# Patient Record
Sex: MALE | Race: WHITE | NOT HISPANIC OR LATINO | Employment: OTHER | URBAN - METROPOLITAN AREA
[De-identification: names, ages, dates, MRNs, and addresses within clinical notes are randomized per-mention and may not be internally consistent; named-entity substitution may affect disease eponyms.]

---

## 2017-10-16 ENCOUNTER — GENERIC CONVERSION - ENCOUNTER (OUTPATIENT)
Dept: OTHER | Facility: OTHER | Age: 82
End: 2017-10-16

## 2022-05-03 ENCOUNTER — HOSPITAL ENCOUNTER (EMERGENCY)
Facility: HOSPITAL | Age: 87
Discharge: HOME | End: 2022-05-03
Attending: EMERGENCY MEDICINE
Payer: MEDICARE

## 2022-05-03 VITALS
DIASTOLIC BLOOD PRESSURE: 69 MMHG | TEMPERATURE: 97.5 F | RESPIRATION RATE: 18 BRPM | HEART RATE: 75 BPM | OXYGEN SATURATION: 95 % | SYSTOLIC BLOOD PRESSURE: 115 MMHG

## 2022-05-03 DIAGNOSIS — U07.1 COVID-19: Primary | ICD-10-CM

## 2022-05-03 PROCEDURE — 99283 EMERGENCY DEPT VISIT LOW MDM: CPT

## 2022-05-03 PROCEDURE — 99284 EMERGENCY DEPT VISIT MOD MDM: CPT | Performed by: EMERGENCY MEDICINE

## 2022-05-03 RX ORDER — ONDANSETRON 4 MG/1
4 TABLET, FILM COATED ORAL EVERY 6 HOURS PRN
Qty: 20 TABLET | Refills: 0 | Status: SHIPPED | OUTPATIENT
Start: 2022-05-03

## 2022-05-03 RX ORDER — TRAZODONE HYDROCHLORIDE 50 MG/1
50 TABLET ORAL
COMMUNITY

## 2022-05-03 RX ORDER — SIMVASTATIN 10 MG
10 TABLET ORAL DAILY
COMMUNITY

## 2022-05-03 RX ORDER — TRAMADOL HYDROCHLORIDE 50 MG/1
50 TABLET ORAL EVERY 8 HOURS PRN
COMMUNITY
Start: 2022-03-04 | End: 2023-03-04

## 2022-05-03 RX ORDER — ATORVASTATIN CALCIUM 10 MG/1
10 TABLET, FILM COATED ORAL
COMMUNITY
Start: 2022-03-25

## 2022-05-03 RX ORDER — BUSPIRONE HYDROCHLORIDE 5 MG/1
5 TABLET ORAL 2 TIMES DAILY
COMMUNITY
Start: 2022-03-04 | End: 2023-03-04

## 2022-05-03 RX ORDER — PANTOPRAZOLE SODIUM 40 MG/1
40 TABLET, DELAYED RELEASE ORAL DAILY
COMMUNITY

## 2022-05-03 NOTE — ED PROVIDER NOTES
History  Chief Complaint   Patient presents with    Flu Symptoms     Pt reports testing positive for COVID today  C/o aches, sore throat, and HA   Sore Throat     HPI patient is a 82-year-old male  Patient reports he went on a senior trip last Wednesday and people were coughing  He reports Wednesday night and Thursday he developed cough and congestion  Patient reports symptoms over last 5 or 6 days  Patient reports he had a test today which showed that he had positive COVID  Patient reports no shortness of breath  He reports some nausea and decreased appetite  He reports feeling body aches and sore all over  Complains of some sore throat  He reports intermittent headache  Denies any fever  Patient reports currently primarily does not have nausea headache but reports some sore throat when he swallows  He does reports some diffuse body aches  Patient reports he has a history of some memory issues is not allowed to drive outside of the area from his doctor  Apparently his wife stays within frequently answer questions  I was able to bring the wife back into the room  She reports that he has been coughing and somewhat congested  Complaining of sore throat  They deny any acute shortness of breath  Patient denies any chest pain  Patient has planned spinal surgery in June, from the wife it appears to be steroid injections  Past medical history coronary disease, pacemaker, spinal surgery  Family history noncontributory  Nonsmoker no history of drug abuse, here with his wife, history of memory issues    Prior to Admission Medications   Prescriptions Last Dose Informant Patient Reported?  Taking?   atorvastatin (LIPITOR) 10 mg tablet   Yes Yes   Sig: Take 10 mg by mouth   busPIRone (BUSPAR) 5 mg tablet   Yes Yes   Sig: Take 5 mg by mouth 2 (two) times a day   carbidopa-levodopa (SINEMET)  mg per tablet   Yes Yes   Sig: Take 1 tablet by mouth daily   pantoprazole (PROTONIX) 40 mg tablet   Yes No Sig: Take 40 mg by mouth daily   simvastatin (ZOCOR) 10 mg tablet   Yes No   Sig: Take 10 mg by mouth daily   traMADol (ULTRAM) 50 mg tablet   Yes Yes   Sig: Take 50 mg by mouth every 8 (eight) hours as needed   traZODone (DESYREL) 50 mg tablet   Yes No   Sig: Take 50 mg by mouth      Facility-Administered Medications: None       History reviewed  No pertinent past medical history  History reviewed  No pertinent surgical history  History reviewed  No pertinent family history  I have reviewed and agree with the history as documented  E-Cigarette/Vaping     E-Cigarette/Vaping Substances     Social History     Tobacco Use    Smoking status: Never Smoker    Smokeless tobacco: Never Used   Substance Use Topics    Alcohol use: Never    Drug use: Never       Review of Systems   Constitutional: Negative for diaphoresis, fatigue and fever  HENT: Negative for congestion, ear pain, nosebleeds and sore throat  Eyes: Negative for photophobia, pain, discharge and visual disturbance  Respiratory: Positive for cough  Negative for choking, chest tightness, shortness of breath and wheezing  Cardiovascular: Negative for chest pain and palpitations  Gastrointestinal: Negative for abdominal distention, abdominal pain, diarrhea and vomiting  Genitourinary: Negative for dysuria, flank pain and frequency  Musculoskeletal: Negative for back pain, gait problem and joint swelling  Skin: Negative for color change and rash  Neurological: Negative for dizziness, syncope and headaches  Psychiatric/Behavioral: Negative for behavioral problems and confusion  The patient is not nervous/anxious  All other systems reviewed and are negative  Physical Exam  Physical Exam  Vitals and nursing note reviewed  Constitutional:       Appearance: He is well-developed  HENT:      Head: Normocephalic        Right Ear: External ear normal       Left Ear: External ear normal       Nose: Nose normal    Eyes: General: Lids are normal       Pupils: Pupils are equal, round, and reactive to light  Cardiovascular:      Rate and Rhythm: Normal rate and regular rhythm  Pulses: Normal pulses  Heart sounds: Normal heart sounds  Pulmonary:      Effort: Pulmonary effort is normal  No respiratory distress  Breath sounds: Normal breath sounds  Abdominal:      General: Abdomen is flat  Bowel sounds are normal       Tenderness: There is no abdominal tenderness  Musculoskeletal:         General: No deformity  Normal range of motion  Cervical back: Normal range of motion and neck supple  Skin:     General: Skin is warm and dry  Neurological:      Mental Status: He is alert and oriented to person, place, and time  Pulse oximetry 95% on room air adequate oxygenation , there is no hypoxia  Patient ambulates without discomfort  Vital Signs  ED Triage Vitals [05/03/22 1309]   Temperature Pulse Respirations Blood Pressure SpO2   97 5 °F (36 4 °C) 75 18 115/69 95 %      Temp Source Heart Rate Source Patient Position - Orthostatic VS BP Location FiO2 (%)   Temporal Monitor Sitting Left arm --      Pain Score       --           Vitals:    05/03/22 1309   BP: 115/69   Pulse: 75   Patient Position - Orthostatic VS: Sitting         Visual Acuity      ED Medications  Medications - No data to display    Diagnostic Studies  Results Reviewed     None                 No orders to display              Procedures  Procedures         ED Course                               SBIRT 22yo+      Most Recent Value   SBIRT (22 yo +)    In order to provide better care to our patients, we are screening all of our patients for alcohol and drug use  Would it be okay to ask you these screening questions? No Filed at: 05/03/2022 1329                    [facility] medical decision making 74-year-old male presents emergency department with some cough and congested tested positive for COVID today  Patient not hypoxic he is nontoxic  Discussed with patient he is out of the window for the oral COVID medications a greater than 5 days  Discussed with patient may qualify for monoclonal antibody but at this point I am not sure  Discussed I will submit his name for possible monoclonal antibody  This point since he is essentially tolerating his illness well no treatment is indicated, discussed indications to return such as increasing shortness of breath difficulty breathing shortness of breath with minimal exertion or any problems  Disposition  Final diagnoses:   COVID-19     Time reflects when diagnosis was documented in both MDM as applicable and the Disposition within this note     Time User Action Codes Description Comment    5/3/2022  1:30 PM Bruno Arzate Add [U07 1] COVID-19       ED Disposition     ED Disposition Condition Date/Time Comment    Discharge Stable Tue May 3, 2022  1:30 PM Agip U  89  discharge to home/self care              Follow-up Information     Follow up With Specialties Details Why Contact Info    Sravani Norris PA-C Physician Assistant   [address]  [phone]            Discharge Medication List as of 5/3/2022  1:35 PM      START taking these medications    Details   ondansetron (ZOFRAN) 4 mg tablet Take 1 tablet (4 mg total) by mouth every 6 (six) hours as needed for nausea or vomiting for up to 20 doses, Starting Tue 5/3/2022, Normal         CONTINUE these medications which have NOT CHANGED    Details   atorvastatin (LIPITOR) 10 mg tablet Take 10 mg by mouth, Starting Fri 3/25/2022, Historical Med      busPIRone (BUSPAR) 5 mg tablet Take 5 mg by mouth 2 (two) times a day, Starting Fri 3/4/2022, Until Sat 3/4/2023, Historical Med      carbidopa-levodopa (SINEMET)  mg per tablet Take 1 tablet by mouth daily, Starting Tue 11/9/2021, Until Wed 11/9/2022, Historical Med      traMADol (ULTRAM) 50 mg tablet Take 50 mg by mouth every 8 (eight) hours as needed, Starting Fri 3/4/2022, Until Sat 3/4/2023 at 2359, Historical Med      pantoprazole (PROTONIX) 40 mg tablet Take 40 mg by mouth daily, Historical Med      simvastatin (ZOCOR) 10 mg tablet Take 10 mg by mouth daily, Historical Med      traZODone (DESYREL) 50 mg tablet Take 50 mg by mouth, Historical Med             No discharge procedures on file      PDMP Review     None          ED Provider  Electronically Signed by           Rey Batres MD  05/03/22 [id number] [phone]

## 2022-05-03 NOTE — DISCHARGE INSTRUCTIONS
No sign of hypoxia or need for admission at this time  Zofran every 6-8 hours if needed for nausea, decreased appetite  Follow-up with your doctor and the [address] follow-up team a call you about monoclonal antibody    Returned increasing cough shortness of breath worsening symptoms or any problems

## 2022-05-04 ENCOUNTER — TELEPHONE (OUTPATIENT)
Dept: FAMILY MEDICINE CLINIC | Facility: CLINIC | Age: 87
End: 2022-05-04

## 2022-05-04 NOTE — TELEPHONE ENCOUNTER
Follow up call to patient  Spoke with spouse Ann Lizama)  No change to S/S from ER on 5/3/22  Patient continues with main complaint of sore throat  Spouse states follow up is made with PCP  They have my contact information if needed

## 2022-05-04 NOTE — TELEPHONE ENCOUNTER
----- Message from Aissatou Rodriguez MD sent at 5/3/2022  1:30 PM EDT -----  Regarding: Possible monoclonal antibody  82-year-old male hypoxic just tested recently for COVID, history of some dementia and coronary disease pacemaker, back trouble

## 2023-05-06 ENCOUNTER — OFFICE VISIT (OUTPATIENT)
Dept: URGENT CARE | Facility: CLINIC | Age: 88
End: 2023-05-06

## 2023-05-06 VITALS
RESPIRATION RATE: 16 BRPM | HEART RATE: 70 BPM | SYSTOLIC BLOOD PRESSURE: 154 MMHG | OXYGEN SATURATION: 96 % | DIASTOLIC BLOOD PRESSURE: 98 MMHG

## 2023-05-06 DIAGNOSIS — H11.32 CONJUNCTIVAL HEMORRHAGE OF LEFT EYE: Primary | ICD-10-CM

## 2023-05-06 RX ORDER — MEMANTINE HYDROCHLORIDE 10 MG/1
TABLET ORAL
COMMUNITY
Start: 2023-03-13

## 2023-05-06 RX ORDER — WARFARIN SODIUM 3 MG/1
TABLET ORAL
COMMUNITY
Start: 2023-03-13 | End: 2024-03-11

## 2023-05-06 NOTE — PROGRESS NOTES
[facility]        NAME: Chel Fuller is a 82 y o  male  : 1935    MRN: [id number]  DATE: May 6, 2023  TIME: 12:02 PM    Assessment and Orders   Conjunctival hemorrhage of left eye [H11 32]  1  Conjunctival hemorrhage of left eye              Plan and Discussion      Symptoms and exam consistent with comfortable hemorrhage  No treatment necessary  Encouraged daily Flonase for allergy relief  Reassured that patient is not contagious  Discussed ED precautions including (but not limited to)   Difficultly breathing or shortness of breath   Chest pain   Acutely worsening symptoms  Risks and benefits discussed  Patient understands and agrees with the plan  Follow up with PCP  Chief Complaint     Chief Complaint   Patient presents with    Conjunctivitis     STARTED YESTERDAY, BUT LOOKS WORSE TODAY AND PHARMACIST SAID TO GET IT CHECKED         History of Present Illness       Patient has been itching his eyes from allergies especially since working out in the yard  No discharge or fevers  Patient has an appointment with his eye doctor on Monday but wife was concerned that he was contagious and wanted to rule out bacterial conjunctivitis  Conjunctivitis   The current episode started 5 to 7 days ago  The onset was gradual  The problem has been gradually worsening  Associated symptoms include eye itching, rhinorrhea and eye redness  Pertinent negatives include no decreased vision, no double vision, no photophobia, no congestion, no eye discharge and no eye pain  Review of Systems   Review of Systems   HENT: Positive for rhinorrhea  Negative for congestion  Eyes: Positive for redness and itching  Negative for double vision, photophobia, pain and discharge           Current Medications       Current Outpatient Medications:     atorvastatin (LIPITOR) 10 mg tablet, Take 10 mg by mouth, Disp: , Rfl:     memantine (NAMENDA) 10 mg tablet, , Disp: , Rfl:     ondansetron (ZOFRAN) 4 mg tablet, Take 1 tablet (4 mg total) by mouth every 6 (six) hours as needed for nausea or vomiting for up to 20 doses, Disp: 20 tablet, Rfl: 0    warfarin (COUMADIN) 3 mg tablet, 4 5 mg (3 mg x 1 5) every Sun, Thu; 3 mg (3 mg x 1) all other days, Disp: , Rfl:     busPIRone (BUSPAR) 5 mg tablet, Take 5 mg by mouth 2 (two) times a day, Disp: , Rfl:     carbidopa-levodopa (SINEMET)  mg per tablet, Take 1 tablet by mouth daily, Disp: , Rfl:     simvastatin (ZOCOR) 10 mg tablet, Take 10 mg by mouth daily (Patient not taking: Reported on 5/6/2023), Disp: , Rfl:     traZODone (DESYREL) 50 mg tablet, Take 50 mg by mouth (Patient not taking: Reported on 5/6/2023), Disp: , Rfl:     Current Allergies     Allergies as of 05/06/2023    (No Known Allergies)            The following portions of the patient's history were reviewed and updated as appropriate: allergies, current medications, past family history, past medical history, past social history, past surgical history and problem list      No past medical history on file  No past surgical history on file  No family history on file  Medications have been verified  Objective   /98   Pulse 70   Resp 16   SpO2 96%   No LMP for male patient  Physical Exam     Physical Exam  HENT:      Head: Normocephalic and atraumatic  Eyes:     Pulmonary:      Effort: No respiratory distress  Neurological:      General: No focal deficit present  Mental Status: He is alert and oriented to person, place, and time     Psychiatric:         Mood and Affect: Mood normal          Behavior: Behavior normal                Carmelitaylle Abdiasing Klevero DO

## 2024-09-30 ENCOUNTER — EVALUATION (OUTPATIENT)
Dept: PHYSICAL THERAPY | Facility: CLINIC | Age: 89
End: 2024-09-30
Payer: MEDICARE

## 2024-09-30 DIAGNOSIS — R26.9 ABNORMAL GAIT: Primary | ICD-10-CM

## 2024-09-30 DIAGNOSIS — F03.918 DEMENTIA WITH OTHER BEHAVIORAL DISTURBANCE, UNSPECIFIED DEMENTIA SEVERITY, UNSPECIFIED DEMENTIA TYPE (HCC): ICD-10-CM

## 2024-09-30 DIAGNOSIS — I48.20 CHRONIC ATRIAL FIBRILLATION (HCC): ICD-10-CM

## 2024-09-30 PROCEDURE — 97535 SELF CARE MNGMENT TRAINING: CPT

## 2024-09-30 PROCEDURE — 97162 PT EVAL MOD COMPLEX 30 MIN: CPT

## 2024-09-30 NOTE — PROGRESS NOTES
PT Evaluation     Today's date: 2024  Patient name: Jesse Shore  : 1935  MRN: [id number]  Referring provider: Humberto Escalona PA-C  Dx:   Encounter Diagnosis     ICD-10-CM    1. Abnormal gait  R26.9       2. Dementia with other behavioral disturbance, unspecified dementia severity, unspecified dementia type (HCC)  F03.918       3. Chronic atrial fibrillation (HCC)  I48.20                      Assessment  Impairments: abnormal gait, activity intolerance, impaired balance, impaired physical strength, lacks appropriate home exercise program, pain with function, poor posture , activity limitations and endurance    Assessment details: Pt presents with decline in ambulation, balance, and strength/endurance.  Hx multiple falls with last fall 2 weeks ago.  Reports limited overall activity levels.  Increased left lower back pain from testing left kidney.  Pain increases in lower back with standing/walking.  Difficulty with all balance activity. Increased TUG test and 5 time sit to stand noted.  Reports symptoms of orthostatic hypotension as well with positional changes.  Will benefit from PT tx to decrease symptoms and improve functional level.       Prognosis: fair    Goals  ST.  Decrease pain 50% 6 wk.  2.  Increase BLE strength by 1/2 MMT grade 6 wk.  3.  Decrease TUG Test time by 2-3  sec 6 wk  4.  Decreased 5x sit to stand test time by 2-3 sec 6 wk  5.  Pt will report no falls with mobility 6 wk       LT.  Pt will report no pain 12 wk.   2.  Increase BLE strength by 1 MMT grade 12 wk   3.  Decrease TUG Test time by 4-5 sec 12 wk  4.  Decrease 5x sit to stand test time by 4-5 sec 12 wk  5.  Pt will report no limitations with ADL's 12 wk  6.  Pt will report no LOB's with ambulation/ADL's 12 wk    Plan  Patient would benefit from: skilled physical therapy and PT eval  Planned modality interventions: cryotherapy and thermotherapy: hydrocollator packs    Planned therapy interventions: abdominal trunk  stabilization, manual therapy, neuromuscular re-education, balance, self care, strengthening, stretching, therapeutic activities, therapeutic exercise, flexibility, functional ROM exercises, gait training and home exercise program    Frequency: 3x week  Duration in weeks: 12  Treatment plan discussed with: patient        Subjective Evaluation    History of Present Illness  Mechanism of injury: Pt presents with decline in ambulatory ability, decreased balance, and hx falls.  Last fall 2 weeks ago with abrasion to left elbow.  Reports left lower back pain due to biopsy/injections to left kidney.  No buckling of LE's noted.  Discomfort noted RLE at times.   Reports decline in functional level with injection and current CT scans to left kidney.    Patient Goals  Patient goal: I want to get better  Pain  Current pain ratin  At best pain ratin  At worst pain ratin  Location: left lower back  Quality: dull ache and discomfort  Relieving factors: change in position and heat  Aggravating factors: walking, standing and sitting          Objective     Postural Observations    Additional Postural Observation Details  Trunk minimally forward flexed  Forward head  Forward rounded shoulders     Palpation   Left   Muscle spasm in the erector spinae and lumbar paraspinals.   Tenderness of the erector spinae, lumbar paraspinals and quadratus lumborum.     Neurological Testing     Sensation     Lumbar   Left   Intact: light touch    Right   Intact: light touch    Strength/Myotome Testing     Left Hip   Planes of Motion   Flexion: 3  Extension: 3  Abduction: 3+  Adduction: 3+    Right Hip   Planes of Motion   Flexion: 3+  Extension: 3+  Abduction: 3+  Adduction: 3+    Left Knee   Flexion: 3  Extension: 3    Right Knee   Flexion: 3+  Extension: 3+    Left Ankle/Foot   Dorsiflexion: 3  Plantar flexion: 3    Right Ankle/Foot   Dorsiflexion: 3+  Plantar flexion: 3+    Ambulation     Ambulation: Stairs   Ascend stairs:  independent  Pattern: non-reciprocal  Railings: two rails  Descend stairs: independent  Pattern: reciprocal  Railings: two rails    Additional Stairs Ambulation Details  More difficulty ascending     Observational Gait   Gait: asymmetric   Decreased walking speed and stride length.     Additional Observational Gait Details  Has cane for gait  Walking short distances only due to fatigue and SOB  Difficulty walking up driveway due to incline   Reports left lower L-spine stiff and sore with ambulation in facility  Rigid trunk, decreased pace and step length  Trunk minimally forward flexed and head forward flexed    Functional Assessment        Comments  Tandem Stance:  Unable   Neuro Exam:     Functional outcomes   5x sit to stand: 43.75 (seconds)  TU.20 (no AD) (seconds)      Balance assessments   Single leg stance   Left eyes open firm surface: 0  Right eyes open firm surface: 1-2         Precautions:  10# lifting restriction, PACEMAKER (no e-stim/ultrasound)       Manuals 24                                       Neuro Re-Ed         Side stepping at rail        U stance        Tandem stance        Stepping on/off foam                                Ther Ex        Nustep for LE strength, endurance        TR/HR        LAQ        T-band HS curl                                HEP Instructed and issued HEP  ([id number])       Ther Activity        Mini squat        Sit to stand        Step up                Gait Training                        Modalities

## 2024-09-30 NOTE — LETTER
2024    Humberto Escalona PA-C  [address] [place] [address]  [address]  [facility] [place] [address]    Patient: Jesse Shore   YOB: 1935   Date of Visit: 2024     Encounter Diagnosis     ICD-10-CM    1. Abnormal gait  R26.9       2. Dementia with other behavioral disturbance, unspecified dementia severity, unspecified dementia type (HCC)  F03.918       3. Chronic atrial fibrillation (HCC)  I48.20           Dear Dr. Escalona:    Thank you for your recent referral of Jesse Shore. Please review the attached evaluation summary from Jesse's recent visit.     Please verify that you agree with the plan of care by signing the attached order.     If you have any questions or concerns, please do not hesitate to call.     I sincerely appreciate the opportunity to share in the care of one of your patients and hope to have another opportunity to work with you in the near future.       Sincerely,    Salbador Altman, PT      Referring Provider:      I certify that I have read the below Plan of Care and certify the need for these services furnished under this plan of treatment while under my care.                    Humberto Escalona PA-C  [address] [place] [address]  [address]  [facility] [place] [address]  Via Fax: [phone]          PT Evaluation     Today's date: 2024  Patient name: Jesse Shore  : 1935  MRN: [id number]  Referring provider: Humberto Escalona PA-C  Dx:   Encounter Diagnosis     ICD-10-CM    1. Abnormal gait  R26.9       2. Dementia with other behavioral disturbance, unspecified dementia severity, unspecified dementia type (HCC)  F03.918       3. Chronic atrial fibrillation (HCC)  I48.20                      Assessment  Impairments: abnormal gait, activity intolerance, impaired balance, impaired physical strength, lacks appropriate home exercise program, pain with function, poor posture , activity limitations and endurance    Assessment details: Pt presents with decline in ambulation, balance, and  strength/endurance.  Hx multiple falls with last fall 2 weeks ago.  Reports limited overall activity levels.  Increased left lower back pain from testing left kidney.  Pain increases in lower back with standing/walking.  Difficulty with all balance activity. Increased TUG test and 5 time sit to stand noted.  Reports symptoms of orthostatic hypotension as well with positional changes.  Will benefit from PT tx to decrease symptoms and improve functional level.       Prognosis: fair    Goals  ST.  Decrease pain 50% 6 wk.  2.  Increase BLE strength by 1/2 MMT grade 6 wk.  3.  Decrease TUG Test time by 2-3  sec 6 wk  4.  Decreased 5x sit to stand test time by 2-3 sec 6 wk  5.  Pt will report no falls with mobility 6 wk       LT.  Pt will report no pain 12 wk.   2.  Increase BLE strength by 1 MMT grade 12 wk   3.  Decrease TUG Test time by 4-5 sec 12 wk  4.  Decrease 5x sit to stand test time by 4-5 sec 12 wk  5.  Pt will report no limitations with ADL's 12 wk  6.  Pt will report no LOB's with ambulation/ADL's 12 wk    Plan  Patient would benefit from: skilled physical therapy and PT eval  Planned modality interventions: cryotherapy and thermotherapy: hydrocollator packs    Planned therapy interventions: abdominal trunk stabilization, manual therapy, neuromuscular re-education, balance, self care, strengthening, stretching, therapeutic activities, therapeutic exercise, flexibility, functional ROM exercises, gait training and home exercise program    Frequency: 3x week  Duration in weeks: 12  Treatment plan discussed with: patient        Subjective Evaluation    History of Present Illness  Mechanism of injury: Pt presents with decline in ambulatory ability, decreased balance, and hx falls.  Last fall 2 weeks ago with abrasion to left elbow.  Reports left lower back pain due to biopsy/injections to left kidney.  No buckling of LE's noted.  Discomfort noted RLE at times.   Reports decline in functional level with  injection and current CT scans to left kidney.    Patient Goals  Patient goal: I want to get better  Pain  Current pain ratin  At best pain ratin  At worst pain ratin  Location: left lower back  Quality: dull ache and discomfort  Relieving factors: change in position and heat  Aggravating factors: walking, standing and sitting          Objective     Postural Observations    Additional Postural Observation Details  Trunk minimally forward flexed  Forward head  Forward rounded shoulders     Palpation   Left   Muscle spasm in the erector spinae and lumbar paraspinals.   Tenderness of the erector spinae, lumbar paraspinals and quadratus lumborum.     Neurological Testing     Sensation     Lumbar   Left   Intact: light touch    Right   Intact: light touch    Strength/Myotome Testing     Left Hip   Planes of Motion   Flexion: 3  Extension: 3  Abduction: 3+  Adduction: 3+    Right Hip   Planes of Motion   Flexion: 3+  Extension: 3+  Abduction: 3+  Adduction: 3+    Left Knee   Flexion: 3  Extension: 3    Right Knee   Flexion: 3+  Extension: 3+    Left Ankle/Foot   Dorsiflexion: 3  Plantar flexion: 3    Right Ankle/Foot   Dorsiflexion: 3+  Plantar flexion: 3+    Ambulation     Ambulation: Stairs   Ascend stairs: independent  Pattern: non-reciprocal  Railings: two rails  Descend stairs: independent  Pattern: reciprocal  Railings: two rails    Additional Stairs Ambulation Details  More difficulty ascending     Observational Gait   Gait: asymmetric   Decreased walking speed and stride length.     Additional Observational Gait Details  Has cane for gait  Walking short distances only due to fatigue and SOB  Difficulty walking up driveway due to incline   Reports left lower L-spine stiff and sore with ambulation in facility  Rigid trunk, decreased pace and step length  Trunk minimally forward flexed and head forward flexed    Functional Assessment        Comments  Tandem Stance:  Unable   Neuro Exam:     Functional  outcomes   5x sit to stand: 43.75 (seconds)  TU.20 (no AD) (seconds)      Balance assessments   Single leg stance   Left eyes open firm surface: 0  Right eyes open firm surface: 1-2         Precautions:  10# lifting restriction, PACEMAKER (no e-stim/ultrasound)       Manuals -24                                       Neuro Re-Ed         Side stepping at rail        U stance        Tandem stance        Stepping on/off foam                                Ther Ex        Nustep for LE strength, endurance        TR/HR        LAQ        T-band HS curl                                HEP Instructed and issued HEP  ([id number])       Ther Activity        Mini squat        Sit to stand        Step up                Gait Training                        Modalities

## 2024-10-03 ENCOUNTER — OFFICE VISIT (OUTPATIENT)
Dept: PHYSICAL THERAPY | Facility: CLINIC | Age: 89
End: 2024-10-03
Payer: MEDICARE

## 2024-10-03 DIAGNOSIS — R26.9 ABNORMAL GAIT: Primary | ICD-10-CM

## 2024-10-03 DIAGNOSIS — F03.918 DEMENTIA WITH OTHER BEHAVIORAL DISTURBANCE, UNSPECIFIED DEMENTIA SEVERITY, UNSPECIFIED DEMENTIA TYPE (HCC): ICD-10-CM

## 2024-10-03 DIAGNOSIS — I48.20 CHRONIC ATRIAL FIBRILLATION (HCC): ICD-10-CM

## 2024-10-03 PROCEDURE — 97112 NEUROMUSCULAR REEDUCATION: CPT

## 2024-10-03 PROCEDURE — 97110 THERAPEUTIC EXERCISES: CPT

## 2024-10-03 NOTE — PROGRESS NOTES
"Daily Note     Today's date: 10/3/2024  Patient name: Jesse Shore  : 1935  MRN: [id number]  Referring provider: Humberto Escalona PA-C  Dx:   Encounter Diagnosis     ICD-10-CM    1. Abnormal gait  R26.9       2. Dementia with other behavioral disturbance, unspecified dementia severity, unspecified dementia type (HCC)  F03.918       3. Chronic atrial fibrillation (HCC)  I48.20                      Subjective:   I had an anxiety attack this morning so I'm still feeling not right from that      Objective: See treatment diary below      Assessment: Tolerated treatment fair.  Limited tolerance to TE.  Frequent rest periods required.  Reports anxiety medication makes him tired.  Reports still feeling some anxiety from earlier in the day.  Patient would benefit from continued PT      Plan: Continue per plan of care.       Precautions:  10# lifting restriction, PACEMAKER (no e-stim/ultrasound)       Manuals 9-30-24 10-3-24                                      Neuro Re-Ed         Side stepping at rail        U stance        Tandem stance  On foam 2x ea LE Fwd on foam to tolerance      Stepping on/off foam  5x R/L at rail on/off foam                              Ther Ex        Nustep for LE strength, endurance  L1  5'      TR/HR  10x ea      LAQ  1#  10x mychal      T-band HS curl  Green 10x mychal      Seated t-band abd  Green 10x 3\"      Add sq  10x  5\"                                      HEP Instructed and issued HEP  ([id number])       Ther Activity        Mini squat  9x      Sit to stand        Step up                Gait Training                        Modalities                            "

## 2024-10-07 ENCOUNTER — OFFICE VISIT (OUTPATIENT)
Dept: PHYSICAL THERAPY | Facility: CLINIC | Age: 89
End: 2024-10-07
Payer: MEDICARE

## 2024-10-07 DIAGNOSIS — I48.20 CHRONIC ATRIAL FIBRILLATION (HCC): ICD-10-CM

## 2024-10-07 DIAGNOSIS — F03.918 DEMENTIA WITH OTHER BEHAVIORAL DISTURBANCE, UNSPECIFIED DEMENTIA SEVERITY, UNSPECIFIED DEMENTIA TYPE (HCC): ICD-10-CM

## 2024-10-07 DIAGNOSIS — R26.9 ABNORMAL GAIT: Primary | ICD-10-CM

## 2024-10-07 PROCEDURE — 97112 NEUROMUSCULAR REEDUCATION: CPT

## 2024-10-07 PROCEDURE — 97110 THERAPEUTIC EXERCISES: CPT

## 2024-10-07 PROCEDURE — 97530 THERAPEUTIC ACTIVITIES: CPT

## 2024-10-07 NOTE — PROGRESS NOTES
"Daily Note     Today's date: 10/7/2024  Patient name: Jesse Shore  : 1935  MRN: [id number]  Referring provider: Humberto Escalona PA-C  Dx:   Encounter Diagnosis     ICD-10-CM    1. Abnormal gait  R26.9       2. Dementia with other behavioral disturbance, unspecified dementia severity, unspecified dementia type (HCC)  F03.918       3. Chronic atrial fibrillation (HCC)  I48.20                      Subjective:   I'm ok      Objective: See treatment diary below      Assessment: Tolerated treatment fair.   Fatigues easily with TE.  Demonstrates weakness with seated LE TE.  Reports nustep felt good.  Patient would benefit from continued PT      Plan: Continue per plan of care.       Precautions:  10# lifting restriction, PACEMAKER (no e-stim/ultrasound)       Manuals 9-30-24 10-3-24 10-7-24                                     Neuro Re-Ed         Side stepping at rail        U stance        Tandem stance  On foam 2x ea LE Fwd on foam to tolerance On foam 2x ea LE Fwd on foam to tolerance       Stepping on/off foam  5x R/L at rail on/off foam 8x  R/L at rail on/off foam                             Ther Ex        Nustep for LE strength, endurance  L1  5' L2  10'     TR/HR  10x ea 10x     LAQ  1#  10x mychal 1#  8x  mychal      T-band HS curl  Green 10x mychal      Seated t-band abd  Green 10x 3\" Green 10x  3\"     Add sq  10x  5\" 7x  5\"                                     HEP Instructed and issued HEP  ([id number])       Ther Activity        Mini squat  9x 2x 5 reps     Sit to stand        Step up                Gait Training                        Modalities                            "

## 2024-10-09 ENCOUNTER — OFFICE VISIT (OUTPATIENT)
Dept: PHYSICAL THERAPY | Facility: CLINIC | Age: 89
End: 2024-10-09
Payer: MEDICARE

## 2024-10-09 DIAGNOSIS — F03.918 DEMENTIA WITH OTHER BEHAVIORAL DISTURBANCE, UNSPECIFIED DEMENTIA SEVERITY, UNSPECIFIED DEMENTIA TYPE (HCC): ICD-10-CM

## 2024-10-09 DIAGNOSIS — I48.20 CHRONIC ATRIAL FIBRILLATION (HCC): ICD-10-CM

## 2024-10-09 DIAGNOSIS — R26.9 ABNORMAL GAIT: Primary | ICD-10-CM

## 2024-10-09 PROCEDURE — 97112 NEUROMUSCULAR REEDUCATION: CPT

## 2024-10-09 PROCEDURE — 97110 THERAPEUTIC EXERCISES: CPT

## 2024-10-09 NOTE — PROGRESS NOTES
"Daily Note     Today's date: 10/9/2024  Patient name: Jesse Shore  : 1935  MRN: [id number]  Referring provider: Humberto Escalona PA-C  Dx:   Encounter Diagnosis     ICD-10-CM    1. Abnormal gait  R26.9       2. Dementia with other behavioral disturbance, unspecified dementia severity, unspecified dementia type (HCC)  F03.918       3. Chronic atrial fibrillation (HCC)  I48.20                      Subjective:   I'm ok.  I can get up the steps better      Objective: See treatment diary below      Assessment: Tolerated treatment fair.  Continues to fatigue easily with activity.  Reports continued tightness left lower lumbar region.  To have CT scan next week of kidney.  Patient would benefit from continued PT      Plan: Continue per plan of care.       Precautions:  10# lifting restriction, PACEMAKER (no e-stim/ultrasound)       Manuals 9-30-24 10-3-24 10-7-24 10-9-24                                    Neuro Re-Ed         Side stepping at rail        U stance        Tandem stance  On foam 2x ea LE Fwd on foam to tolerance On foam 2x ea LE Fwd on foam to tolerance   On foam 2x ea LE Fwd on foam to tolerance    Stepping on/off foam  5x R/L at rail on/off foam 8x  R/L at rail on/off foam 8x R/L at rail on/off foam    LTP/MTP    Red LTP 10x  Red MTP 7x                     Ther Ex        Nustep for LE strength, endurance  L1  5' L2  10' L2  12'    TR/HR  10x ea 10x 10x      LAQ  1#  10x mychal 1#  8x  mychal  2x 5 reps mychal    T-band HS curl  Green 10x mychal      Seated t-band abd  Green 10x 3\" Green 10x  3\"     Add sq  10x  5\" 7x  5\"     Seated march    10x  mychal                            HEP Instructed and issued HEP  ([id number])       Ther Activity        Mini squat  9x 2x 5 reps 2x5 reps    Sit to stand        Step up                Gait Training                        Modalities                            "

## 2024-10-15 ENCOUNTER — OFFICE VISIT (OUTPATIENT)
Dept: PHYSICAL THERAPY | Facility: CLINIC | Age: 89
End: 2024-10-15
Payer: MEDICARE

## 2024-10-15 DIAGNOSIS — F03.918 DEMENTIA WITH OTHER BEHAVIORAL DISTURBANCE, UNSPECIFIED DEMENTIA SEVERITY, UNSPECIFIED DEMENTIA TYPE (HCC): ICD-10-CM

## 2024-10-15 DIAGNOSIS — R26.9 ABNORMAL GAIT: Primary | ICD-10-CM

## 2024-10-15 DIAGNOSIS — I48.20 CHRONIC ATRIAL FIBRILLATION (HCC): ICD-10-CM

## 2024-10-15 PROCEDURE — 97110 THERAPEUTIC EXERCISES: CPT

## 2024-10-15 NOTE — PROGRESS NOTES
"Daily Note     Today's date: 10/15/2024  Patient name: Jesse Shore  : 1935  MRN: [id number]  Referring provider: Humberto Escalona PA-C  Dx:   Encounter Diagnosis     ICD-10-CM    1. Abnormal gait  R26.9       2. Dementia with other behavioral disturbance, unspecified dementia severity, unspecified dementia type (HCC)  F03.918       3. Chronic atrial fibrillation (HCC)  I48.20                      Subjective:   I'm not good today.  I had a CT scan yesterday and I didn't sleep well last night      Objective: See treatment diary below      Assessment: Tolerated treatment fair.   Limited TE due to reports of fatigue and not feeling well following CT scan yesterday.  Results of scan unknown presently.  Patient would benefit from continued PT      Plan: Continue per plan of care.       Precautions:  10# lifting restriction, PACEMAKER (no e-stim/ultrasound)       Manuals -24 10-3-24 10-7-24 10-9-24 10-15-24                                   Neuro Re-Ed         Side stepping at rail        U stance        Tandem stance  On foam 2x ea LE Fwd on foam to tolerance On foam 2x ea LE Fwd on foam to tolerance   On foam 2x ea LE Fwd on foam to tolerance    Stepping on/off foam  5x R/L at rail on/off foam 8x  R/L at rail on/off foam 8x R/L at rail on/off foam 8x R/L at rail on/off foam (lateral) no hand hold 3x's   LTP/MTP    Red LTP 10x  Red MTP 7x                     Ther Ex        Nustep for LE strength, endurance  L1  5' L2  10' L2  12' L2  15'   TR/HR  10x ea 10x 10x   10z   LAQ  1#  10x mychal 1#  8x  mychal  2x 5 reps mychal    T-band HS curl  Green 10x mychal      Seated t-band abd  Green 10x 3\" Green 10x  3\"  Green 15x   Add sq  10x  5\" 7x  5\"     Seated march    10x  mychal 12x mychal                           HEP Instructed and issued HEP  ([id number])       Ther Activity        Mini squat  9x 2x 5 reps 2x5 reps    Sit to stand        Step up                Gait Training                        Modalities                            "

## 2024-10-17 ENCOUNTER — OFFICE VISIT (OUTPATIENT)
Dept: PHYSICAL THERAPY | Facility: CLINIC | Age: 89
End: 2024-10-17
Payer: MEDICARE

## 2024-10-17 DIAGNOSIS — R26.9 ABNORMAL GAIT: Primary | ICD-10-CM

## 2024-10-17 DIAGNOSIS — F03.918 DEMENTIA WITH OTHER BEHAVIORAL DISTURBANCE, UNSPECIFIED DEMENTIA SEVERITY, UNSPECIFIED DEMENTIA TYPE (HCC): ICD-10-CM

## 2024-10-17 DIAGNOSIS — I48.20 CHRONIC ATRIAL FIBRILLATION (HCC): ICD-10-CM

## 2024-10-17 PROCEDURE — 97110 THERAPEUTIC EXERCISES: CPT

## 2024-10-17 PROCEDURE — 97112 NEUROMUSCULAR REEDUCATION: CPT

## 2024-10-17 NOTE — PROGRESS NOTES
Daily Note     Today's date: 10/17/2024  Patient name: Jesse Shore  : 1935  MRN: [id number]  Referring provider: Humberto Escalona PA-C  Dx:   Encounter Diagnosis     ICD-10-CM    1. Abnormal gait  R26.9       2. Dementia with other behavioral disturbance, unspecified dementia severity, unspecified dementia type (HCC)  F03.918       3. Chronic atrial fibrillation (HCC)  I48.20                      Subjective:   I feel better than I did the other day I was here.      Objective: See treatment diary below      Assessment: Tolerated treatment fair.   Reports feeling better than he did at prior session on 10-15-24.  PT noted pt left eye appeared to have had a vessel rupture as eye blood red.  Pt was unaware of eye.  He reported no pain or vision changes.  Reports this has happened before and had MD follow up in the past for same symptoms.  Improved activity tolerance noted during session compared to last session.  Difficulty with balance activity.  Patient would benefit from continued PT      Plan: Continue per plan of care.       Precautions:  10# lifting restriction, PACEMAKER (no e-stim/ultrasound)       Manuals 10-17-24 10-3-24 10-7-24 10-9-24 10-15-24                                   Neuro Re-Ed         Side stepping at rail        U stance        Stand static on foam 1x to tolerance       Tandem stance 3x to celena ea LE Fwd on foam On foam 2x ea LE Fwd on foam to tolerance On foam 2x ea LE Fwd on foam to tolerance   On foam 2x ea LE Fwd on foam to tolerance    Stepping on/off foam 10x R/L at rail on/off foam (lateral)  hand hold and no hand hold  5x R/L at rail on/off foam 8x  R/L at rail on/off foam 8x R/L at rail on/off foam 8x R/L at rail on/off foam (lateral) no hand hold 3x's   LTP/MTP    Red LTP 10x  Red MTP 7x                     Ther Ex        Nustep for LE strength, endurance L2  20' L1  5' L2  10' L2  12' L2  15'   TR/HR 10x 10x ea 10x 10x   10z   LAQ  1#  10x mychal 1#  8x  mychal  2x 5 reps mychal    T-band  "HS curl  Green 10x mychal      Seated t-band abd Green 10x Green 10x 3\" Green 10x  3\"  Green 15x   Add sq 10x  5\" 10x  5\" 7x  5\"     Seated march    10x  mychal 12x mychal                           HEP Instructed and issued HEP  ([id number])       Ther Activity        Mini squat 10x 9x 2x 5 reps 2x5 reps    Sit to stand        Step up                Gait Training                        Modalities                            "

## 2024-10-21 ENCOUNTER — APPOINTMENT (OUTPATIENT)
Dept: PHYSICAL THERAPY | Facility: CLINIC | Age: 89
End: 2024-10-21
Payer: MEDICARE

## 2024-10-23 ENCOUNTER — OFFICE VISIT (OUTPATIENT)
Dept: PHYSICAL THERAPY | Facility: CLINIC | Age: 89
End: 2024-10-23
Payer: MEDICARE

## 2024-10-23 DIAGNOSIS — F03.918 DEMENTIA WITH OTHER BEHAVIORAL DISTURBANCE, UNSPECIFIED DEMENTIA SEVERITY, UNSPECIFIED DEMENTIA TYPE (HCC): ICD-10-CM

## 2024-10-23 DIAGNOSIS — I48.20 CHRONIC ATRIAL FIBRILLATION (HCC): ICD-10-CM

## 2024-10-23 DIAGNOSIS — R26.9 ABNORMAL GAIT: Primary | ICD-10-CM

## 2024-10-23 PROCEDURE — 97112 NEUROMUSCULAR REEDUCATION: CPT

## 2024-10-23 PROCEDURE — 97110 THERAPEUTIC EXERCISES: CPT

## 2024-10-23 NOTE — PROGRESS NOTES
"Daily Note     Today's date: 10/23/2024  Patient name: Jesse Shore  : 1935  MRN: [id number]  Referring provider: Humberto Escalona PA-C  Dx:   Encounter Diagnosis     ICD-10-CM    1. Abnormal gait  R26.9       2. Dementia with other behavioral disturbance, unspecified dementia severity, unspecified dementia type (HCC)  F03.918       3. Chronic atrial fibrillation (HCC)  I48.20                      Subjective:   I feel better than I did the other day I was here.      Objective: See treatment diary below      Assessment: Tolerated treatment fair. Pt continues to demonstrate good effort throughout session. He began on the nu step as an active warmup. Frequent rest breaks were needed throughout session due to fatigue. He did do well with side steps over the foam needing CGAx1 and at times min Ax1. Patient would benefit from continued PT      Plan: Continue per plan of care.       Precautions:  10# lifting restriction, PACEMAKER (no e-stim/ultrasound)       Manuals 10-17-24 10-23-24  10-9-24 10-15-24                                   Neuro Re-Ed         Side stepping at rail        U stance        Stand static on foam 1x to tolerance 1x to tolerance      Tandem stance 3x to celena ea LE Fwd on foam 3x to celena ea LE Fwd on foam  On foam 2x ea LE Fwd on foam to tolerance    Stepping on/off foam 10x R/L at rail on/off foam (lateral)  hand hold and no hand hold  10x R/L at rail on/off foam (lateral)  hand hold and no hand hold   8x R/L at rail on/off foam 8x R/L at rail on/off foam (lateral) no hand hold 3x's   LTP/MTP    Red LTP 10x  Red MTP 7x                     Ther Ex        Nustep for LE strength, endurance L2  20' L2 20'  L2  12' L2  15'   TR/HR 10x 10x  10x   10z   LAQ    2x 5 reps mychal    T-band HS curl        Seated t-band abd Green 10x Green 15x   Green 15x   Add sq 10x  5\" 15x5\"      Seated march    10x  mychal 12x mychal                           HEP Instructed and issued HEP  ([id number])       Ther Activity      "   Mini squat 10x 10x  2x5 reps    Sit to stand        Step up                Gait Training                        Modalities

## 2024-10-30 ENCOUNTER — OFFICE VISIT (OUTPATIENT)
Dept: PHYSICAL THERAPY | Facility: CLINIC | Age: 89
End: 2024-10-30
Payer: MEDICARE

## 2024-10-30 DIAGNOSIS — I48.20 CHRONIC ATRIAL FIBRILLATION (HCC): ICD-10-CM

## 2024-10-30 DIAGNOSIS — F03.918 DEMENTIA WITH OTHER BEHAVIORAL DISTURBANCE, UNSPECIFIED DEMENTIA SEVERITY, UNSPECIFIED DEMENTIA TYPE (HCC): ICD-10-CM

## 2024-10-30 DIAGNOSIS — R26.9 ABNORMAL GAIT: Primary | ICD-10-CM

## 2024-10-30 PROCEDURE — 97112 NEUROMUSCULAR REEDUCATION: CPT

## 2024-10-30 PROCEDURE — 97110 THERAPEUTIC EXERCISES: CPT

## 2024-10-30 NOTE — PROGRESS NOTES
PT RE-EVALUATION     Today's date: 10/30/2024  Patient name: Jesse Shore  : 1935  MRN: [id number]  Referring provider: Humberto Escalona PA-C  Dx:   Encounter Diagnosis     ICD-10-CM    1. Abnormal gait  R26.9       2. Dementia with other behavioral disturbance, unspecified dementia severity, unspecified dementia type (HCC)  F03.918       3. Chronic atrial fibrillation (HCC)  I48.20                      Assessment  Impairments: abnormal gait, activity intolerance, impaired balance, impaired physical strength, lacks appropriate home exercise program, pain with function, poor posture , activity limitations and endurance    Assessment details: Pt presents with decline in ambulation, balance, and strength/endurance.  Hx multiple falls with last fall 2 weeks ago.  Reports limited overall activity levels.  Increased left lower back pain from testing left kidney.  Pain increases in lower back with standing/walking.  Difficulty with all balance activity. Increased TUG test and 5 time sit to stand noted.  Reports symptoms of orthostatic hypotension as well with positional changes.  Will benefit from PT tx to decrease symptoms and improve functional level.      10-30-24:  Pt reports continued symptoms left lower back region.  Reports he will be seeing pain management in approx 2 weeks.  Continues to fatigue easily with activity.  Gait asymmetric and slowed.  Intermittent use of SPC noted.  Difficulty reaching down and pushing back up from low seated position noted.  Will benefit from continued PT tx to decrease symptoms and improve functional level.       Prognosis: fair    Goals  ST.  Decrease pain 50% 6 wk.  2.  Increase BLE strength by 1/2 MMT grade 6 wk.  3.  Decrease TUG Test time by 2-3  sec 6 wk  4.  Decreased 5x sit to stand test time by 2-3 sec 6 wk  5.  Pt will report no falls with mobility 6 wk   (met/partially met)    LT.  Pt will report no pain 12 wk.   2.  Increase BLE strength by 1 MMT grade 12  wk   3.  Decrease TUG Test time by 4-5 sec 12 wk  4.  Decrease 5x sit to stand test time by 4-5 sec 12 wk  5.  Pt will report no limitations with ADL's 12 wk  6.  Pt will report no LOB's with ambulation/ADL's 12 wk  (partially met/not met)    Plan  Patient would benefit from: skilled physical therapy and PT eval  Planned modality interventions: cryotherapy and thermotherapy: hydrocollator packs    Planned therapy interventions: abdominal trunk stabilization, manual therapy, neuromuscular re-education, balance, self care, strengthening, stretching, therapeutic activities, therapeutic exercise, flexibility, functional ROM exercises, gait training and home exercise program    Frequency: 3x week  Duration in weeks: 12  Treatment plan discussed with: patient        Subjective Evaluation    History of Present Illness  Mechanism of injury: Pt presents with decline in ambulatory ability, decreased balance, and hx falls.  Last fall 2 weeks ago with abrasion to left elbow.  Reports left lower back pain due to biopsy/injections to left kidney.  No buckling of LE's noted.  Discomfort noted RLE at times.   Reports decline in functional level with injection and current CT scans to left kidney.    Patient Goals  Patient goals for therapy: decreased pain  Patient goal: I want to get better  Pain  Current pain ratin  At best pain ratin  At worst pain ratin  Location: left lower back  Quality: dull ache, discomfort and sharp  Relieving factors: change in position and heat  Aggravating factors: walking, standing, sitting and lifting    Treatments  Current treatment: physical therapy      Objective     Postural Observations    Additional Postural Observation Details  Trunk minimally forward flexed  Forward head  Forward rounded shoulders     Palpation   Left   Muscle spasm in the erector spinae and lumbar paraspinals.   Tenderness of the erector spinae, lumbar paraspinals and quadratus lumborum.     Neurological Testing      Sensation     Lumbar   Left   Intact: light touch    Right   Intact: light touch    Strength/Myotome Testing     Left Hip   Planes of Motion   Flexion: 3+  Extension: 3+  Abduction: 4-  Adduction: 4-    Right Hip   Planes of Motion   Flexion: 4-  Extension: 4-  Abduction: 4-  Adduction: 4-    Left Knee   Flexion: 3+  Extension: 3+    Right Knee   Flexion: 4-  Extension: 4-    Left Ankle/Foot   Dorsiflexion: 3+  Plantar flexion: 3+    Right Ankle/Foot   Dorsiflexion: 4-  Plantar flexion: 4-    Ambulation     Ambulation: Stairs   Ascend stairs: independent  Pattern: non-reciprocal  Railings: two rails  Descend stairs: independent  Pattern: reciprocal  Railings: two rails    Additional Stairs Ambulation Details  More difficulty ascending     Observational Gait   Gait: asymmetric   Decreased walking speed and stride length.     Additional Observational Gait Details  Has cane for gait  Walking short distances due to fatigue and SOB  Difficulty with inclines  Rigid trunk, decreased pace and step length  Trunk minimally forward flexed and head forward flexed    Functional Assessment        Comments  Tandem Stance:  Unable   Neuro Exam:     Functional outcomes   5x sit to stand: 26.7 (seconds)  TU.68  ( No AD) (seconds)      Balance assessments   Single leg stance   Left eyes open firm surface: 0  Right eyes open firm surface: 1-2        Precautions:  10# lifting restriction, PACEMAKER (no e-stim/ultrasound)       Manuals 10-17-24 10-23-24 10-30-24 10-9-24 10-15-24                                   Neuro Re-Ed         Side stepping at rail        U stance        Stand static on foam 1x to tolerance 1x to tolerance      Tandem stance 3x to celena ea LE Fwd on foam 3x to celena ea LE Fwd on foam  On foam 2x ea LE Fwd on foam to tolerance    Stepping on/off foam 10x R/L at rail on/off foam (lateral)  hand hold and no hand hold  10x R/L at rail on/off foam (lateral)  hand hold and no hand hold  10x R/L at rail on/off foam  "(lateral)  8x R/L at rail on/off foam 8x R/L at rail on/off foam (lateral) no hand hold 3x's   LTP/MTP    Red LTP 10x  Red MTP 7x     St hip 3-way   15x ea BLE             Ther Ex        Nustep for LE strength, endurance L2  20' L2 20' L3  20' L2  12' L2  15'   TR/HR 10x 10x 15x 10x   10z   LAQ    2x 5 reps mychal    T-band HS curl        Seated t-band abd Green 10x Green 15x   Green 15x   Add sq 10x  5\" 15x5\"      Seated march    10x  mychal 12x mychal                           HEP Instructed and issued HEP  ([id number])       Ther Activity        Mini squat 10x 10x 15x 2x5 reps    Sit to stand        Step up                Gait Training                        Modalities                            "

## 2024-10-30 NOTE — LETTER
2024    Humberto Escalona PA-C  [address] [place] [address]  [address]  [facility] [place] [address]    Patient: Jesse Shore   YOB: 1935   Date of Visit: 10/30/2024     Encounter Diagnosis     ICD-10-CM    1. Abnormal gait  R26.9       2. Dementia with other behavioral disturbance, unspecified dementia severity, unspecified dementia type (HCC)  F03.918       3. Chronic atrial fibrillation (HCC)  I48.20           Dear Dr. Escalona:    Thank you for your recent referral of Jesse Shore. Please review the attached evaluation summary from Jesse's recent visit.     Please verify that you agree with the plan of care by signing the attached order.     If you have any questions or concerns, please do not hesitate to call.     I sincerely appreciate the opportunity to share in the care of one of your patients and hope to have another opportunity to work with you in the near future.       Sincerely,    Salbador Altman, PT      Referring Provider:      I certify that I have read the below Plan of Care and certify the need for these services furnished under this plan of treatment while under my care.                    Humberto Escalona PA-C  [facility]  [address]  [place] [address]  Via Fax: [phone]          PT RE-EVALUATION     Today's date: 10/30/2024  Patient name: Jesse Shore  : 1935  MRN: [id number]  Referring provider: Humberto Escalona PA-C  Dx:   Encounter Diagnosis     ICD-10-CM    1. Abnormal gait  R26.9       2. Dementia with other behavioral disturbance, unspecified dementia severity, unspecified dementia type (HCC)  F03.918       3. Chronic atrial fibrillation (HCC)  I48.20                      Assessment  Impairments: abnormal gait, activity intolerance, impaired balance, impaired physical strength, lacks appropriate home exercise program, pain with function, poor posture , activity limitations and endurance    Assessment details: Pt presents with decline in ambulation, balance, and  strength/endurance.  Hx multiple falls with last fall 2 weeks ago.  Reports limited overall activity levels.  Increased left lower back pain from testing left kidney.  Pain increases in lower back with standing/walking.  Difficulty with all balance activity. Increased TUG test and 5 time sit to stand noted.  Reports symptoms of orthostatic hypotension as well with positional changes.  Will benefit from PT tx to decrease symptoms and improve functional level.      10-30-24:  Pt reports continued symptoms left lower back region.  Reports he will be seeing pain management in approx 2 weeks.  Continues to fatigue easily with activity.  Gait asymmetric and slowed.  Intermittent use of SPC noted.  Difficulty reaching down and pushing back up from low seated position noted.  Will benefit from continued PT tx to decrease symptoms and improve functional level.       Prognosis: fair    Goals  ST.  Decrease pain 50% 6 wk.  2.  Increase BLE strength by 1/2 MMT grade 6 wk.  3.  Decrease TUG Test time by 2-3  sec 6 wk  4.  Decreased 5x sit to stand test time by 2-3 sec 6 wk  5.  Pt will report no falls with mobility 6 wk   (met/partially met)    LT.  Pt will report no pain 12 wk.   2.  Increase BLE strength by 1 MMT grade 12 wk   3.  Decrease TUG Test time by 4-5 sec 12 wk  4.  Decrease 5x sit to stand test time by 4-5 sec 12 wk  5.  Pt will report no limitations with ADL's 12 wk  6.  Pt will report no LOB's with ambulation/ADL's 12 wk  (partially met/not met)    Plan  Patient would benefit from: skilled physical therapy and PT eval  Planned modality interventions: cryotherapy and thermotherapy: hydrocollator packs    Planned therapy interventions: abdominal trunk stabilization, manual therapy, neuromuscular re-education, balance, self care, strengthening, stretching, therapeutic activities, therapeutic exercise, flexibility, functional ROM exercises, gait training and home exercise program    Frequency: 3x  week  Duration in weeks: 12  Treatment plan discussed with: patient        Subjective Evaluation    History of Present Illness  Mechanism of injury: Pt presents with decline in ambulatory ability, decreased balance, and hx falls.  Last fall 2 weeks ago with abrasion to left elbow.  Reports left lower back pain due to biopsy/injections to left kidney.  No buckling of LE's noted.  Discomfort noted RLE at times.   Reports decline in functional level with injection and current CT scans to left kidney.    Patient Goals  Patient goals for therapy: decreased pain  Patient goal: I want to get better  Pain  Current pain ratin  At best pain ratin  At worst pain ratin  Location: left lower back  Quality: dull ache, discomfort and sharp  Relieving factors: change in position and heat  Aggravating factors: walking, standing, sitting and lifting    Treatments  Current treatment: physical therapy      Objective     Postural Observations    Additional Postural Observation Details  Trunk minimally forward flexed  Forward head  Forward rounded shoulders     Palpation   Left   Muscle spasm in the erector spinae and lumbar paraspinals.   Tenderness of the erector spinae, lumbar paraspinals and quadratus lumborum.     Neurological Testing     Sensation     Lumbar   Left   Intact: light touch    Right   Intact: light touch    Strength/Myotome Testing     Left Hip   Planes of Motion   Flexion: 3+  Extension: 3+  Abduction: 4-  Adduction: 4-    Right Hip   Planes of Motion   Flexion: 4-  Extension: 4-  Abduction: 4-  Adduction: 4-    Left Knee   Flexion: 3+  Extension: 3+    Right Knee   Flexion: 4-  Extension: 4-    Left Ankle/Foot   Dorsiflexion: 3+  Plantar flexion: 3+    Right Ankle/Foot   Dorsiflexion: 4-  Plantar flexion: 4-    Ambulation     Ambulation: Stairs   Ascend stairs: independent  Pattern: non-reciprocal  Railings: two rails  Descend stairs: independent  Pattern: reciprocal  Railings: two rails    Additional  "Stairs Ambulation Details  More difficulty ascending     Observational Gait   Gait: asymmetric   Decreased walking speed and stride length.     Additional Observational Gait Details  Has cane for gait  Walking short distances due to fatigue and SOB  Difficulty with inclines  Rigid trunk, decreased pace and step length  Trunk minimally forward flexed and head forward flexed    Functional Assessment        Comments  Tandem Stance:  Unable   Neuro Exam:     Functional outcomes   5x sit to stand: 26.7 (seconds)  TU.68  ( No AD) (seconds)      Balance assessments   Single leg stance   Left eyes open firm surface: 0  Right eyes open firm surface: 1-2        Precautions:  10# lifting restriction, PACEMAKER (no e-stim/ultrasound)       Manuals 10-17-24 10-23-24 10-30-24 10-9-24 10-15-24                                   Neuro Re-Ed         Side stepping at rail        U stance        Stand static on foam 1x to tolerance 1x to tolerance      Tandem stance 3x to celena ea LE Fwd on foam 3x to celena ea LE Fwd on foam  On foam 2x ea LE Fwd on foam to tolerance    Stepping on/off foam 10x R/L at rail on/off foam (lateral)  hand hold and no hand hold  10x R/L at rail on/off foam (lateral)  hand hold and no hand hold  10x R/L at rail on/off foam (lateral)  8x R/L at rail on/off foam 8x R/L at rail on/off foam (lateral) no hand hold 3x's   LTP/MTP    Red LTP 10x  Red MTP 7x     St hip 3-way   15x ea BLE             Ther Ex        Nustep for LE strength, endurance L2  20' L2 20' L3  20' L2  12' L2  15'   TR/HR 10x 10x 15x 10x   10z   LAQ    2x 5 reps mychal    T-band HS curl        Seated t-band abd Green 10x Green 15x   Green 15x   Add sq 10x  5\" 15x5\"      Seated march    10x  mychal 12x mychal                           HEP Instructed and issued HEP  ([id number])       Ther Activity        Mini squat 10x 10x 15x 2x5 reps    Sit to stand        Step up                Gait Training                        Modalities                          "

## 2024-10-31 NOTE — PROGRESS NOTES
"Daily Note     Today's date: 2024  Patient name: Jesse Shore  : 1935  MRN: [id number]  Referring provider: Humberto Escalona PA-C  Dx:   Encounter Diagnosis     ICD-10-CM    1. Abnormal gait  R26.9       2. Dementia with other behavioral disturbance, unspecified dementia severity, unspecified dementia type (HCC)  F03.918       3. Chronic atrial fibrillation (HCC)  I48.20                      Subjective: The patient states that he was working outside on Wednesday, his back is bothering him today.        Objective: See treatment diary below      Assessment: Tolerated treatment well.  Patient needed rest breaks t/o session.  He is challenged with balance, at times needs 1-2 UE assist on bar to help maintain his balance.  Patient demonstrated fatigue post treatment and would benefit from continued PT      Plan: Continue per plan of care.      Precautions:  10# lifting restriction, PACEMAKER (no e-stim/ultrasound)        Manuals 10-17-24 10-23-24 10-30-24 11/1 10-15-24                                                           Neuro Re-Ed              Side stepping at rail             U stance             Stand static on foam 1x to tolerance 1x to tolerance    Resume NV     Tandem stance 3x to celena ea LE Fwd on foam 3x to celena ea LE Fwd on foam   Resume NV     Stepping on/off foam 10x R/L at rail on/off foam (lateral)  hand hold and no hand hold  10x R/L at rail on/off foam (lateral)  hand hold and no hand hold  10x R/L at rail on/off foam (lateral)  10x R/L at rail on/off foam  (Lateral) 8x R/L at rail on/off foam (lateral) no hand hold 3x's   LTP/MTP            St hip 3-way     15x ea BLE 15x ea Mychal                   Ther Ex             Nustep for LE strength, endurance L2  20' L2 20' L3  20' L3 20' L2  15'   TR/HR 10x 10x 15x 15x 10z   LAQ            T-band HS curl            Seated t-band abd Green 10x Green 15x   Green 20x Green 15x   Add sq 10x  5\" 15x5\"   5\"x20     Seated march        12x mychal               "                               HEP Instructed and issued HEP  ([id number])           Ther Activity             Mini squat 10x 10x 15x 15x     Sit to stand             Step up                           Gait Training                                         Modalities

## 2024-11-01 ENCOUNTER — OFFICE VISIT (OUTPATIENT)
Dept: PHYSICAL THERAPY | Facility: CLINIC | Age: 89
End: 2024-11-01
Payer: MEDICARE

## 2024-11-01 DIAGNOSIS — R26.9 ABNORMAL GAIT: Primary | ICD-10-CM

## 2024-11-01 DIAGNOSIS — I48.20 CHRONIC ATRIAL FIBRILLATION (HCC): ICD-10-CM

## 2024-11-01 DIAGNOSIS — F03.918 DEMENTIA WITH OTHER BEHAVIORAL DISTURBANCE, UNSPECIFIED DEMENTIA SEVERITY, UNSPECIFIED DEMENTIA TYPE (HCC): ICD-10-CM

## 2024-11-01 PROCEDURE — 97112 NEUROMUSCULAR REEDUCATION: CPT | Performed by: PHYSICAL THERAPIST

## 2024-11-01 PROCEDURE — 97110 THERAPEUTIC EXERCISES: CPT | Performed by: PHYSICAL THERAPIST

## 2024-11-07 ENCOUNTER — OFFICE VISIT (OUTPATIENT)
Dept: PHYSICAL THERAPY | Facility: CLINIC | Age: 89
End: 2024-11-07
Payer: MEDICARE

## 2024-11-07 DIAGNOSIS — I48.20 CHRONIC ATRIAL FIBRILLATION (HCC): ICD-10-CM

## 2024-11-07 DIAGNOSIS — R26.9 ABNORMAL GAIT: Primary | ICD-10-CM

## 2024-11-07 DIAGNOSIS — F03.918 DEMENTIA WITH OTHER BEHAVIORAL DISTURBANCE, UNSPECIFIED DEMENTIA SEVERITY, UNSPECIFIED DEMENTIA TYPE (HCC): ICD-10-CM

## 2024-11-07 PROCEDURE — 97112 NEUROMUSCULAR REEDUCATION: CPT

## 2024-11-07 PROCEDURE — 97110 THERAPEUTIC EXERCISES: CPT

## 2024-11-07 NOTE — LETTER
2024    Humberto Escalona PA-C  [address] [place] [address]  [address]  [place] [address]    Patient: Jesse Shore   YOB: 1935   Date of Visit: 2024     Encounter Diagnosis     ICD-10-CM    1. Abnormal gait  R26.9       2. Dementia with other behavioral disturbance, unspecified dementia severity, unspecified dementia type (HCC)  F03.918       3. Chronic atrial fibrillation (HCC)  I48.20           Dear Dr. Escalona:    Thank you for your recent referral of Jesse Shore. Please review the attached evaluation summary from Jesse's recent visit.     Please verify that you agree with the plan of care by signing the attached order.     If you have any questions or concerns, please do not hesitate to call.     I sincerely appreciate the opportunity to share in the care of one of your patients and hope to have another opportunity to work with you in the near future.       Sincerely,    Salbador Altman, PT      Referring Provider:      I certify that I have read the below Plan of Care and certify the need for these services furnished under this plan of treatment while under my care.                    Humberto Escalona PA-C  [facility]  [address]  [place] [address]  Via Fax: [phone]          PT RE-EVALUATION     Today's date: 2024  Patient name: Jesse Shore  : 1935  MRN: [id number]  Referring provider: Humberto Escalona PA-C  Dx:   Encounter Diagnosis     ICD-10-CM    1. Abnormal gait  R26.9       2. Dementia with other behavioral disturbance, unspecified dementia severity, unspecified dementia type (HCC)  F03.918       3. Chronic atrial fibrillation (HCC)  I48.20                      Assessment  Impairments: abnormal gait, activity intolerance, impaired balance, impaired physical strength, lacks appropriate home exercise program, pain with function, poor posture , activity limitations and endurance    Assessment details: Pt presents with decline in ambulation, balance, and  strength/endurance.  Hx multiple falls with last fall 2 weeks ago.  Reports limited overall activity levels.  Increased left lower back pain from testing left kidney.  Pain increases in lower back with standing/walking.  Difficulty with all balance activity. Increased TUG test and 5 time sit to stand noted.  Reports symptoms of orthostatic hypotension as well with positional changes.  Will benefit from PT tx to decrease symptoms and improve functional level.      24:   Pt reports continued pain left lower back with symptoms radiating to left groin.  Pain primarily upper left lumbar region.  He is to see pain management next week for this. Improved activity tolerance noted since beginning PT however continued limited ambulatory ability noted due to pain and fatigue.  Will benefit from continued PT tx to decrease symptoms and improve functional level.       Prognosis: fair    Goals  ST.  Decrease pain 50% 6 wk.  2.  Increase BLE strength by 1/2 MMT grade 6 wk.  3.  Decrease TUG Test time by 2-3  sec 6 wk  4.  Decreased 5x sit to stand test time by 2-3 sec 6 wk  5.  Pt will report no falls with mobility 6 wk   (partially met/not met)    LT.  Pt will report no pain 12 wk.   2.  Increase BLE strength by 1 MMT grade 12 wk   3.  Decrease TUG Test time by 4-5 sec 12 wk  4.  Decrease 5x sit to stand test time by 4-5 sec 12 wk  5.  Pt will report no limitations with ADL's 12 wk  6.  Pt will report no LOB's with ambulation/ADL's 12 wk  (not met)    Plan  Patient would benefit from: skilled physical therapy and PT eval  Planned modality interventions: cryotherapy and thermotherapy: hydrocollator packs    Planned therapy interventions: abdominal trunk stabilization, manual therapy, neuromuscular re-education, balance, self care, strengthening, stretching, therapeutic activities, therapeutic exercise, flexibility, functional ROM exercises, gait training and home exercise program    Frequency: 3x week  Duration in  weeks: 12  Treatment plan discussed with: patient        Subjective Evaluation    History of Present Illness  Mechanism of injury: Pt presents with decline in ambulatory ability, decreased balance, and hx falls.  Last fall 2 weeks ago with abrasion to left elbow.  Reports left lower back pain due to biopsy/injections to left kidney.  No buckling of LE's noted.  Discomfort noted RLE at times.   Reports decline in functional level with injection and current CT scans to left kidney.    Patient Goals  Patient goal: I want to get better  Pain  Current pain ratin  At best pain ratin  At worst pain ratin  Location: left lower back (beginning to radiate to left groin)  Quality: dull ache and discomfort  Relieving factors: change in position and heat  Aggravating factors: walking, standing and sitting (Positional)  Progression: no change    Treatments  Current treatment: physical therapy      Objective     Postural Observations    Additional Postural Observation Details  Trunk minimally forward flexed  Forward head  Forward rounded shoulders     Palpation   Left   Muscle spasm in the erector spinae and lumbar paraspinals.   Tenderness of the erector spinae, lumbar paraspinals and quadratus lumborum.     Neurological Testing     Sensation     Lumbar   Left   Intact: light touch    Right   Intact: light touch    Strength/Myotome Testing     Left Hip   Planes of Motion   Flexion: 3+  Extension: 3+  Abduction: 4-  Adduction: 4-    Right Hip   Planes of Motion   Flexion: 4-  Extension: 4-  Abduction: 4-  Adduction: 4-    Left Knee   Flexion: 3+  Extension: 3+    Right Knee   Flexion: 3+  Extension: 3+    Left Ankle/Foot   Dorsiflexion: 4  Plantar flexion: 4    Right Ankle/Foot   Dorsiflexion: 4  Plantar flexion: 4    Ambulation     Ambulation: Stairs   Ascend stairs: independent  Pattern: non-reciprocal  Railings: two rails  Descend stairs: independent  Pattern: reciprocal  Railings: two rails    Additional Stairs  "Ambulation Details  More difficulty ascending     Observational Gait   Gait: asymmetric   Decreased walking speed and stride length.     Additional Observational Gait Details  Has cane for gait  Walking short distances only due to fatigue and SOB  Difficulty walking up driveway due to incline   Trunk minimally forward flexed and head forward flexed    Functional Assessment        Comments  Tandem Stance:  Unable   Neuro Exam:     Functional outcomes   5x sit to stand: 30.84 (seconds)  TU.44 (no AD) (seconds)      Balance assessments   Single leg stance   Left eyes open firm surface: 0  Right eyes open firm surface: 1-2      Precautions:  10# lifting restriction, PACEMAKER (no e-stim/ultrasound)        Manuals 10-17-24 10-23-24 10-30-24 11/1 11-7-24                                                           Neuro Re-Ed              Side stepping at rail             U stance             Stand static on foam 1x to tolerance 1x to tolerance    Resume NV     Tandem stance 3x to celena ea LE Fwd on foam 3x to celena ea LE Fwd on foam   Resume NV     Stepping on/off foam 10x R/L at rail on/off foam (lateral)  hand hold and no hand hold  10x R/L at rail on/off foam (lateral)  hand hold and no hand hold  10x R/L at rail on/off foam (lateral)  10x R/L at rail on/off foam  (Lateral)    LTP/MTP         Green band  LTP 20x  MTP 10x   St hip 3-way     15x ea BLE 15x ea Brandon                   Ther Ex             Nustep for LE strength, endurance L2  20' L2 20' L3  20' L3 20' L3  21'   TR/HR 10x 10x 15x 15x 12x   LAQ            T-band HS curl            Seated t-band abd Green 10x Green 15x   Green 20x Green 25x    Add sq 10x  5\" 15x5\"   5\"x20     Seated march                                                     HEP Instructed and issued HEP  ([id number])           Ther Activity             Mini squat 10x 10x 15x 15x  20x   Sit to stand             Step up                           Gait Training                                       "   Modalities

## 2024-11-07 NOTE — PROGRESS NOTES
PT RE-EVALUATION     Today's date: 2024  Patient name: Jesse Shore  : 1935  MRN: [id number]  Referring provider: Humberto Escalona PA-C  Dx:   Encounter Diagnosis     ICD-10-CM    1. Abnormal gait  R26.9       2. Dementia with other behavioral disturbance, unspecified dementia severity, unspecified dementia type (HCC)  F03.918       3. Chronic atrial fibrillation (HCC)  I48.20                      Assessment  Impairments: abnormal gait, activity intolerance, impaired balance, impaired physical strength, lacks appropriate home exercise program, pain with function, poor posture , activity limitations and endurance    Assessment details: Pt presents with decline in ambulation, balance, and strength/endurance.  Hx multiple falls with last fall 2 weeks ago.  Reports limited overall activity levels.  Increased left lower back pain from testing left kidney.  Pain increases in lower back with standing/walking.  Difficulty with all balance activity. Increased TUG test and 5 time sit to stand noted.  Reports symptoms of orthostatic hypotension as well with positional changes.  Will benefit from PT tx to decrease symptoms and improve functional level.      24:   Pt reports continued pain left lower back with symptoms radiating to left groin.  Pain primarily upper left lumbar region.  He is to see pain management next week for this. Improved activity tolerance noted since beginning PT however continued limited ambulatory ability noted due to pain and fatigue.  Will benefit from continued PT tx to decrease symptoms and improve functional level.       Prognosis: fair    Goals  ST.  Decrease pain 50% 6 wk.  2.  Increase BLE strength by 1/2 MMT grade 6 wk.  3.  Decrease TUG Test time by 2-3  sec 6 wk  4.  Decreased 5x sit to stand test time by 2-3 sec 6 wk  5.  Pt will report no falls with mobility 6 wk   (partially met/not met)    LT.  Pt will report no pain 12 wk.   2.  Increase BLE strength by 1 MMT  grade 12 wk   3.  Decrease TUG Test time by 4-5 sec 12 wk  4.  Decrease 5x sit to stand test time by 4-5 sec 12 wk  5.  Pt will report no limitations with ADL's 12 wk  6.  Pt will report no LOB's with ambulation/ADL's 12 wk  (not met)    Plan  Patient would benefit from: skilled physical therapy and PT eval  Planned modality interventions: cryotherapy and thermotherapy: hydrocollator packs    Planned therapy interventions: abdominal trunk stabilization, manual therapy, neuromuscular re-education, balance, self care, strengthening, stretching, therapeutic activities, therapeutic exercise, flexibility, functional ROM exercises, gait training and home exercise program    Frequency: 3x week  Duration in weeks: 12  Treatment plan discussed with: patient        Subjective Evaluation    History of Present Illness  Mechanism of injury: Pt presents with decline in ambulatory ability, decreased balance, and hx falls.  Last fall 2 weeks ago with abrasion to left elbow.  Reports left lower back pain due to biopsy/injections to left kidney.  No buckling of LE's noted.  Discomfort noted RLE at times.   Reports decline in functional level with injection and current CT scans to left kidney.    Patient Goals  Patient goal: I want to get better  Pain  Current pain ratin  At best pain ratin  At worst pain ratin  Location: left lower back (beginning to radiate to left groin)  Quality: dull ache and discomfort  Relieving factors: change in position and heat  Aggravating factors: walking, standing and sitting (Positional)  Progression: no change    Treatments  Current treatment: physical therapy      Objective     Postural Observations    Additional Postural Observation Details  Trunk minimally forward flexed  Forward head  Forward rounded shoulders     Palpation   Left   Muscle spasm in the erector spinae and lumbar paraspinals.   Tenderness of the erector spinae, lumbar paraspinals and quadratus lumborum.     Neurological  Testing     Sensation     Lumbar   Left   Intact: light touch    Right   Intact: light touch    Strength/Myotome Testing     Left Hip   Planes of Motion   Flexion: 3+  Extension: 3+  Abduction: 4-  Adduction: 4-    Right Hip   Planes of Motion   Flexion: 4-  Extension: 4-  Abduction: 4-  Adduction: 4-    Left Knee   Flexion: 3+  Extension: 3+    Right Knee   Flexion: 3+  Extension: 3+    Left Ankle/Foot   Dorsiflexion: 4  Plantar flexion: 4    Right Ankle/Foot   Dorsiflexion: 4  Plantar flexion: 4    Ambulation     Ambulation: Stairs   Ascend stairs: independent  Pattern: non-reciprocal  Railings: two rails  Descend stairs: independent  Pattern: reciprocal  Railings: two rails    Additional Stairs Ambulation Details  More difficulty ascending     Observational Gait   Gait: asymmetric   Decreased walking speed and stride length.     Additional Observational Gait Details  Has cane for gait  Walking short distances only due to fatigue and SOB  Difficulty walking up driveway due to incline   Trunk minimally forward flexed and head forward flexed    Functional Assessment        Comments  Tandem Stance:  Unable   Neuro Exam:     Functional outcomes   5x sit to stand: 30.84 (seconds)  TU.44 (no AD) (seconds)      Balance assessments   Single leg stance   Left eyes open firm surface: 0  Right eyes open firm surface: 1-2      Precautions:  10# lifting restriction, PACEMAKER (no e-stim/ultrasound)        Manuals 10-17-24 10-23-24 10-30-24 11/1 11-7-24                                                           Neuro Re-Ed              Side stepping at rail             U stance             Stand static on foam 1x to tolerance 1x to tolerance    Resume NV     Tandem stance 3x to celena ea LE Fwd on foam 3x to celena ea LE Fwd on foam   Resume NV     Stepping on/off foam 10x R/L at rail on/off foam (lateral)  hand hold and no hand hold  10x R/L at rail on/off foam (lateral)  hand hold and no hand hold  10x R/L at rail on/off foam  "(lateral)  10x R/L at rail on/off foam  (Lateral)    LTP/MTP         Green band  LTP 20x  MTP 10x   St hip 3-way     15x ea BLE 15x ea Brandon                   Ther Ex             Nustep for LE strength, endurance L2  20' L2 20' L3  20' L3 20' L3  21'   TR/HR 10x 10x 15x 15x 12x   LAQ            T-band HS curl            Seated t-band abd Green 10x Green 15x   Green 20x Green 25x    Add sq 10x  5\" 15x5\"   5\"x20     Seated march                                                     HEP Instructed and issued HEP  ([id number])           Ther Activity             Mini squat 10x 10x 15x 15x  20x   Sit to stand             Step up                           Gait Training                                         Modalities                                                "

## 2024-11-07 NOTE — LETTER
2024    Humberto Escalona PA-C  [address] [place] [address]  [address]  [place] [address]    Patient: Jesse Shore   YOB: 1935   Date of Visit: 2024     Encounter Diagnosis     ICD-10-CM    1. Abnormal gait  R26.9       2. Dementia with other behavioral disturbance, unspecified dementia severity, unspecified dementia type (HCC)  F03.918       3. Chronic atrial fibrillation (HCC)  I48.20           Dear Dr. Escalona:    Thank you for your recent referral of Jesse Shore. Please review the attached evaluation summary from Jesse's recent visit.     Please verify that you agree with the plan of care by signing the attached order.     If you have any questions or concerns, please do not hesitate to call.     I sincerely appreciate the opportunity to share in the care of one of your patients and hope to have another opportunity to work with you in the near future.       Sincerely,    Salbador Altman, PT      Referring Provider:      I certify that I have read the below Plan of Care and certify the need for these services furnished under this plan of treatment while under my care.                    Humberto Escalona PA-C  [facility]  [address]  [place] [address]  Via Fax: [phone]          PT RE-EVALUATION     Today's date: 2024  Patient name: Jesse Shore  : 1935  MRN: [id number]  Referring provider: Humberto Escalona PA-C  Dx:   Encounter Diagnosis     ICD-10-CM    1. Abnormal gait  R26.9       2. Dementia with other behavioral disturbance, unspecified dementia severity, unspecified dementia type (HCC)  F03.918       3. Chronic atrial fibrillation (HCC)  I48.20                      Assessment  Impairments: abnormal gait, activity intolerance, impaired balance, impaired physical strength, lacks appropriate home exercise program, pain with function, poor posture , activity limitations and endurance    Assessment details: Pt presents with decline in ambulation, balance, and  strength/endurance.  Hx multiple falls with last fall 2 weeks ago.  Reports limited overall activity levels.  Increased left lower back pain from testing left kidney.  Pain increases in lower back with standing/walking.  Difficulty with all balance activity. Increased TUG test and 5 time sit to stand noted.  Reports symptoms of orthostatic hypotension as well with positional changes.  Will benefit from PT tx to decrease symptoms and improve functional level.       Prognosis: fair    Goals  ST.  Decrease pain 50% 6 wk.  2.  Increase BLE strength by 1/2 MMT grade 6 wk.  3.  Decrease TUG Test time by 2-3  sec 6 wk  4.  Decreased 5x sit to stand test time by 2-3 sec 6 wk  5.  Pt will report no falls with mobility 6 wk       LT.  Pt will report no pain 12 wk.   2.  Increase BLE strength by 1 MMT grade 12 wk   3.  Decrease TUG Test time by 4-5 sec 12 wk  4.  Decrease 5x sit to stand test time by 4-5 sec 12 wk  5.  Pt will report no limitations with ADL's 12 wk  6.  Pt will report no LOB's with ambulation/ADL's 12 wk    Plan  Patient would benefit from: skilled physical therapy and PT eval  Planned modality interventions: cryotherapy and thermotherapy: hydrocollator packs    Planned therapy interventions: abdominal trunk stabilization, manual therapy, neuromuscular re-education, balance, self care, strengthening, stretching, therapeutic activities, therapeutic exercise, flexibility, functional ROM exercises, gait training and home exercise program    Frequency: 3x week  Duration in weeks: 12  Treatment plan discussed with: patient      Subjective Evaluation    History of Present Illness  Mechanism of injury: Pt presents with decline in ambulatory ability, decreased balance, and hx falls.  Last fall 2 weeks ago with abrasion to left elbow.  Reports left lower back pain due to biopsy/injections to left kidney.  No buckling of LE's noted.  Discomfort noted RLE at times.   Reports decline in functional level with  injection and current CT scans to left kidney.    Patient Goals  Patient goal: I want to get better  Pain  Current pain ratin  At best pain ratin  At worst pain ratin  Location: left lower back (beginning to radiate to left groin)  Quality: dull ache and discomfort  Relieving factors: change in position and heat  Aggravating factors: walking, standing and sitting (Positional)  Progression: no change    Treatments  Current treatment: physical therapy      Objective     Postural Observations    Additional Postural Observation Details  Trunk minimally forward flexed  Forward head  Forward rounded shoulders     Palpation   Left   Muscle spasm in the erector spinae and lumbar paraspinals.   Tenderness of the erector spinae, lumbar paraspinals and quadratus lumborum.     Neurological Testing     Sensation     Lumbar   Left   Intact: light touch    Right   Intact: light touch    Strength/Myotome Testing     Left Hip   Planes of Motion   Flexion: 3+  Extension: 3+  Abduction: 4-  Adduction: 4-    Right Hip   Planes of Motion   Flexion: 4-  Extension: 4-  Abduction: 4-  Adduction: 4-    Left Knee   Flexion: 3+  Extension: 3+    Right Knee   Flexion: 3+  Extension: 3+    Left Ankle/Foot   Dorsiflexion: 4  Plantar flexion: 4    Right Ankle/Foot   Dorsiflexion: 4  Plantar flexion: 4    Ambulation     Ambulation: Stairs   Ascend stairs: independent  Pattern: non-reciprocal  Railings: two rails  Descend stairs: independent  Pattern: reciprocal  Railings: two rails    Additional Stairs Ambulation Details  More difficulty ascending     Observational Gait   Gait: asymmetric   Decreased walking speed and stride length.     Additional Observational Gait Details  Has cane for gait  Walking short distances only due to fatigue and SOB  Difficulty walking up driveway due to incline   Trunk minimally forward flexed and head forward flexed    Functional Assessment        Comments  Tandem Stance:  Unable   Neuro Exam:  "    Functional outcomes   5x sit to stand: 30.84 (seconds)  TU.44 (no AD) (seconds)      Balance assessments   Single leg stance   Left eyes open firm surface: 0  Right eyes open firm surface: 1-2      Precautions:  10# lifting restriction, PACEMAKER (no e-stim/ultrasound)        Manuals 10-17-24 10-23-24 10-30-24 11/1 11-7-24                                                           Neuro Re-Ed              Side stepping at rail             U stance             Stand static on foam 1x to tolerance 1x to tolerance    Resume NV     Tandem stance 3x to celena ea LE Fwd on foam 3x to celena ea LE Fwd on foam   Resume NV     Stepping on/off foam 10x R/L at rail on/off foam (lateral)  hand hold and no hand hold  10x R/L at rail on/off foam (lateral)  hand hold and no hand hold  10x R/L at rail on/off foam (lateral)  10x R/L at rail on/off foam  (Lateral) 8x R/L at rail on/off foam (lateral) no hand hold 3x's   LTP/MTP            St hip 3-way     15x ea BLE 15x ea Brandon                   Ther Ex             Nustep for LE strength, endurance L2  20' L2 20' L3  20' L3 20' L3  21'   TR/HR 10x 10x 15x 15x 12x   LAQ            T-band HS curl            Seated t-band abd Green 10x Green 15x   Green 20x Green 15x   Add sq 10x  5\" 15x5\"   5\"x20     Seated march        12x brandon                                             HEP Instructed and issued HEP  ([id number])           Ther Activity             Mini squat 10x 10x 15x 15x     Sit to stand             Step up                           Gait Training                                         Modalities                                                                   "

## 2024-11-11 ENCOUNTER — OFFICE VISIT (OUTPATIENT)
Dept: PHYSICAL THERAPY | Facility: CLINIC | Age: 89
End: 2024-11-11
Payer: MEDICARE

## 2024-11-11 DIAGNOSIS — I48.20 CHRONIC ATRIAL FIBRILLATION (HCC): ICD-10-CM

## 2024-11-11 DIAGNOSIS — R26.9 ABNORMAL GAIT: Primary | ICD-10-CM

## 2024-11-11 DIAGNOSIS — F03.918 DEMENTIA WITH OTHER BEHAVIORAL DISTURBANCE, UNSPECIFIED DEMENTIA SEVERITY, UNSPECIFIED DEMENTIA TYPE (HCC): ICD-10-CM

## 2024-11-11 PROCEDURE — 97112 NEUROMUSCULAR REEDUCATION: CPT

## 2024-11-11 PROCEDURE — 97110 THERAPEUTIC EXERCISES: CPT

## 2024-11-11 NOTE — PROGRESS NOTES
"Daily Note     Today's date: 2024  Patient name: Jesse Shore  : 1935  MRN: [id number]  Referring provider: Humberto Escalona PA-C  Dx:   Encounter Diagnosis     ICD-10-CM    1. Abnormal gait  R26.9       2. Dementia with other behavioral disturbance, unspecified dementia severity, unspecified dementia type (HCC)  F03.918       3. Chronic atrial fibrillation (HCC)  I48.20                      Subjective: Jesse reports that he will be seeing pain management this week.       Objective: See treatment diary below      Assessment: Visual and VC to ensure correct exercise technique. External cue with use of chair to ensure correct squat mechanics as pt had fwd translation of pelvis with good follow through. Able to increase reps for majority of exercises per patient request. Pt would continue to benefit from skilled PT.       Plan: Continue with current POC to address pt deficits.      Precautions:  10# lifting restriction, PACEMAKER (no e-stim/ultrasound)       Manuals 11-11-24 10-23-24 10-30-24 11/1 11-7-24                                                       Neuro Re-Ed             Side stepping at rail            U stance            Stand static on foam  1x to tolerance    Resume NV     Tandem stance  3x to celena ea LE Fwd on foam   Resume NV     Stepping on/off foam 10x R/L at rail on/off foam  (Lateral) 10x R/L at rail on/off foam (lateral)  hand hold and no hand hold  10x R/L at rail on/off foam (lateral)  10x R/L at rail on/off foam  (Lateral)    LTP/MTP Green band 20x ea        Green band  LTP 20x  MTP 10x   St hip 3-way    15x ea BLE 15x ea Brandon                  Ther Ex            Nustep for LE strength, endurance Lv3 20' L2 20' L3  20' L3 20' L3  21'   TR/HR 15x ea  10x 15x 15x 12x   LAQ           T-band HS curl           Seated t-band abd Green 25x Green 15x   Green 20x Green 25x    Add sq 5\"x20 15x5\"   5\"x20     Seated march                                                 HEP            Ther Activity  "           Mini squat 20x with ext cue of chair for technique  10x 15x 15x  20x   Sit to stand            Step up                         Gait Training                                      Modalities

## 2024-11-15 ENCOUNTER — OFFICE VISIT (OUTPATIENT)
Dept: PHYSICAL THERAPY | Facility: CLINIC | Age: 89
End: 2024-11-15
Payer: MEDICARE

## 2024-11-15 DIAGNOSIS — R26.9 ABNORMAL GAIT: Primary | ICD-10-CM

## 2024-11-15 DIAGNOSIS — F03.918 DEMENTIA WITH OTHER BEHAVIORAL DISTURBANCE, UNSPECIFIED DEMENTIA SEVERITY, UNSPECIFIED DEMENTIA TYPE (HCC): ICD-10-CM

## 2024-11-15 DIAGNOSIS — I48.20 CHRONIC ATRIAL FIBRILLATION (HCC): ICD-10-CM

## 2024-11-15 PROCEDURE — 97110 THERAPEUTIC EXERCISES: CPT

## 2024-11-15 NOTE — PROGRESS NOTES
"Daily Note     Today's date: 11/15/2024  Patient name: Jesse Shore  : 1935  MRN: [id number]  Referring provider: Humberto Escalona PA-C  Dx:   Encounter Diagnosis     ICD-10-CM    1. Abnormal gait  R26.9       2. Dementia with other behavioral disturbance, unspecified dementia severity, unspecified dementia type (HCC)  F03.918       3. Chronic atrial fibrillation (HCC)  I48.20                      Subjective: Jesse reports getting 2x injection to L aspect of LB. He reports more discomfort to R side today after getting crockpot out of cabinet.       Objective: See treatment diary below      Assessment: Visual and VC to ensure correct exercise technique. Able to complete full program without any reports of pain. Pt would continue to benefit from skilled PT. Progress as able.       Plan: Continue with current POC to address pt deficits.      Precautions:  10# lifting restriction, PACEMAKER (no e-stim/ultrasound)       Manuals 11-11-24 11-15-24 10-30-24 11/1 11-7-24                                                   Neuro Re-Ed            Side stepping at rail           U stance           Stand static on foam      Resume NV     Tandem stance     Resume NV     Stepping on/off foam 10x R/L at rail on/off foam  (Lateral)  10x R/L at rail on/off foam (lateral)  10x R/L at rail on/off foam  (Lateral)    LTP/MTP Green band 20x ea  Green band 20x ea     Green band  LTP 20x  MTP 10x   St hip 3-way   15x ea BLE 15x ea Brandon                 Ther Ex           Nustep for LE strength, endurance Lv3 20' Lv3 20' L3  20' L3 20' L3  21'   TR/HR 15x ea  20x ea 15x 15x 12x   LAQ          T-band HS curl          Seated t-band abd Green 25x Green 25x   Green 20x Green 25x    Add sq 5\"x20 5\" x20   5\"x20     Seated march                                             HEP           Ther Activity           Mini squat 20x with ext cue of chair for technique  20x with ext cue of chair for technique  15x 15x  20x   Sit to stand           Step up    "                    Gait Training                                   Modalities

## 2024-11-18 ENCOUNTER — OFFICE VISIT (OUTPATIENT)
Dept: PHYSICAL THERAPY | Facility: CLINIC | Age: 89
End: 2024-11-18
Payer: MEDICARE

## 2024-11-18 DIAGNOSIS — I48.20 CHRONIC ATRIAL FIBRILLATION (HCC): ICD-10-CM

## 2024-11-18 DIAGNOSIS — F03.918 DEMENTIA WITH OTHER BEHAVIORAL DISTURBANCE, UNSPECIFIED DEMENTIA SEVERITY, UNSPECIFIED DEMENTIA TYPE (HCC): ICD-10-CM

## 2024-11-18 DIAGNOSIS — R26.9 ABNORMAL GAIT: Primary | ICD-10-CM

## 2024-11-18 PROCEDURE — 97112 NEUROMUSCULAR REEDUCATION: CPT

## 2024-11-18 PROCEDURE — 97110 THERAPEUTIC EXERCISES: CPT

## 2024-11-18 NOTE — PROGRESS NOTES
"Daily Note     Today's date: 2024  Patient name: Jesse Shore  : 1935  MRN: [id number]  Referring provider: Humberto Escalona PA-C  Dx:   Encounter Diagnosis     ICD-10-CM    1. Abnormal gait  R26.9       2. Dementia with other behavioral disturbance, unspecified dementia severity, unspecified dementia type (HCC)  F03.918       3. Chronic atrial fibrillation (HCC)  I48.20                      Subjective: Jesse reports falling as he reached far to L and the chair flipped fwd. He denies hitting his head.      Objective: See treatment diary below      Assessment: Visual and VC to ensure correct exercise technique. Tolerated full program without any adverse effects. Improvement in upright posture and technique specifically with mini squats compared to previous visits. Pt would continue to benefit from skilled PT.       Plan: Continue with current POC to address pt deficits.      Precautions:  10# lifting restriction, PACEMAKER (no e-stim/ultrasound)       Manuals 11-11-24 11-15-24 11-18-24 11/1 11-7-24                                               Neuro Re-Ed           Side stepping at rail          U stance          Stand static on foam     Resume NV     Tandem stance    Resume NV     Stepping on/off foam 10x R/L at rail on/off foam  (Lateral)  10x R/L at rail on/off foam  (Lateral) 10x R/L at rail on/off foam  (Lateral)    LTP/MTP Green band 20x ea  Green band 20x ea Green band 20x ea   Green band  LTP 20x  MTP 10x   St hip 3-way    15x ea Brandon                Ther Ex          Nustep for LE strength, endurance Lv3 20' Lv3 20' Lv3 20' L3 20' L3  21'   TR/HR 15x ea  20x ea 20x ea 15x 12x   LAQ         T-band HS curl         Seated t-band abd Green 25x Green 25x Blue 20x Green 20x Green 25x    Add sq 5\"x20 5\" x20 5\" x20 5\"x20     Seated march                                         HEP          Ther Activity          Mini squat 20x with ext cue of chair for technique  20x with ext cue of chair for technique  20x " with ext cue of chair for technique.  15x  20x   Sit to stand          Step up                     Gait Training                                Modalities

## 2024-11-21 ENCOUNTER — OFFICE VISIT (OUTPATIENT)
Dept: PHYSICAL THERAPY | Facility: CLINIC | Age: 89
End: 2024-11-21
Payer: MEDICARE

## 2024-11-21 DIAGNOSIS — F03.918 DEMENTIA WITH OTHER BEHAVIORAL DISTURBANCE, UNSPECIFIED DEMENTIA SEVERITY, UNSPECIFIED DEMENTIA TYPE (HCC): ICD-10-CM

## 2024-11-21 DIAGNOSIS — R26.9 ABNORMAL GAIT: Primary | ICD-10-CM

## 2024-11-21 DIAGNOSIS — I48.20 CHRONIC ATRIAL FIBRILLATION (HCC): ICD-10-CM

## 2024-11-21 PROCEDURE — 97112 NEUROMUSCULAR REEDUCATION: CPT

## 2024-11-21 PROCEDURE — 97110 THERAPEUTIC EXERCISES: CPT

## 2024-11-21 NOTE — PROGRESS NOTES
"Daily Note     Today's date: 2024  Patient name: Jesse Shore  : 1935  MRN: [id number]  Referring provider: Humberto Escalona PA-C  Dx:   Encounter Diagnosis     ICD-10-CM    1. Abnormal gait  R26.9       2. Dementia with other behavioral disturbance, unspecified dementia severity, unspecified dementia type (HCC)  F03.918       3. Chronic atrial fibrillation (HCC)  I48.20                      Subjective: Jesse offers no new complaints in regards to LBP.       Objective: See treatment diary below      Assessment: Able to increase reps for LTP/MTP to further improve upright posture; denied any increases in pain. Verbal and visual cues to ensure correct exercise technique with squats. Pt would continue to benefit from skilled PT.       Plan: Continue with current POC to address pt deficits.      Precautions:  10# lifting restriction, PACEMAKER (no e-stim/ultrasound)       Manuals 11-11-24 11-15-24 11-18-24 11-21-24 11-7-24                                           Neuro Re-Ed          Side stepping at rail         U stance         Stand static on foam         Tandem stance         Stepping on/off foam 10x R/L at rail on/off foam  (Lateral)  10x R/L at rail on/off foam  (Lateral) 10x R/L at rail on/off foam  (Lateral)    LTP/MTP Green band 20x ea  Green band 20x ea Green band 20x ea Green band 25x ea   Green band  LTP 20x  MTP 10x   St hip 3-way                   Ther Ex         Nustep for LE strength, endurance Lv3 20' Lv3 20' Lv3 20' Lv3 20' L3  21'   TR/HR 15x ea  20x ea 20x ea 20x ea 12x   LAQ         T-band HS curl         Seated t-band abd Green 25x Green 25x Blue 20x Blue 20x Green 25x    Add sq 5\"x20 5\" x20 5\" x20 5\" x20     Seated march                                      HEP         Ther Activity         Mini squat 20x with ext cue of chair for technique  20x with ext cue of chair for technique  20x with ext cue of chair for technique.  20x with ext cue of chair for technique.  20x   Sit to stand    "      Step up                   Gait Training                             Modalities

## 2024-11-22 ENCOUNTER — APPOINTMENT (OUTPATIENT)
Dept: PHYSICAL THERAPY | Facility: CLINIC | Age: 89
End: 2024-11-22
Payer: MEDICARE

## 2024-11-25 ENCOUNTER — OFFICE VISIT (OUTPATIENT)
Dept: PHYSICAL THERAPY | Facility: CLINIC | Age: 89
End: 2024-11-25
Payer: MEDICARE

## 2024-11-25 DIAGNOSIS — R26.9 ABNORMAL GAIT: Primary | ICD-10-CM

## 2024-11-25 DIAGNOSIS — I48.20 CHRONIC ATRIAL FIBRILLATION (HCC): ICD-10-CM

## 2024-11-25 DIAGNOSIS — F03.918 DEMENTIA WITH OTHER BEHAVIORAL DISTURBANCE, UNSPECIFIED DEMENTIA SEVERITY, UNSPECIFIED DEMENTIA TYPE (HCC): ICD-10-CM

## 2024-11-25 PROCEDURE — 97110 THERAPEUTIC EXERCISES: CPT

## 2024-11-25 NOTE — PROGRESS NOTES
"Daily Note     Today's date: 2024  Patient name: Jesse Shore  : 1935  MRN: [id number]  Referring provider: Humberto Escalona PA-C  Dx:   Encounter Diagnosis     ICD-10-CM    1. Abnormal gait  R26.9       2. Dementia with other behavioral disturbance, unspecified dementia severity, unspecified dementia type (HCC)  F03.918       3. Chronic atrial fibrillation (HCC)  I48.20                      Subjective:  I'm ok.  Feel tired today      Objective: See treatment diary below      Assessment: Tolerated treatment fair.  Continues to fatigue with standing/sitting TE but does well on nustep.  Minimal pain noted with tx. Patient would benefit from continued PT      Plan: Continue per plan of care.      Precautions:  10# lifting restriction, PACEMAKER (no e-stim/ultrasound)       Manuals 11-11-24 11-15-24 11-18-24 11-21-24 11-25-24                                           Neuro Re-Ed          Side stepping at rail         U stance         Stand static on foam         Tandem stance         Stepping on/off foam 10x R/L at rail on/off foam  (Lateral)  10x R/L at rail on/off foam  (Lateral) 10x R/L at rail on/off foam  (Lateral)    LTP/MTP Green band 20x ea  Green band 20x ea Green band 20x ea Green band 25x ea   Green band  LTP 20x  MTP 20x   St hip 3-way                   Ther Ex         Nustep for LE strength, endurance Lv3 20' Lv3 20' Lv3 20' Lv3 20' L4  22'   TR/HR 15x ea  20x ea 20x ea 20x ea 22x   LAQ         T-band HS curl         Seated t-band abd Green 25x Green 25x Blue 20x Blue 20x Blue 22x    Add sq 5\"x20 5\" x20 5\" x20 5\" x20  22x  5\"   Seated march                                      HEP         Ther Activity         Mini squat 20x with ext cue of chair for technique  20x with ext cue of chair for technique  20x with ext cue of chair for technique.  20x with ext cue of chair for technique.  20x   Sit to stand         Step up                   Gait Training                             Modalities          "

## 2024-12-02 ENCOUNTER — OFFICE VISIT (OUTPATIENT)
Dept: PHYSICAL THERAPY | Facility: CLINIC | Age: 89
End: 2024-12-02
Payer: MEDICARE

## 2024-12-02 DIAGNOSIS — I48.20 CHRONIC ATRIAL FIBRILLATION (HCC): ICD-10-CM

## 2024-12-02 DIAGNOSIS — R26.9 ABNORMAL GAIT: Primary | ICD-10-CM

## 2024-12-02 DIAGNOSIS — F03.918 DEMENTIA WITH OTHER BEHAVIORAL DISTURBANCE, UNSPECIFIED DEMENTIA SEVERITY, UNSPECIFIED DEMENTIA TYPE (HCC): ICD-10-CM

## 2024-12-02 PROCEDURE — 97110 THERAPEUTIC EXERCISES: CPT

## 2024-12-02 NOTE — PROGRESS NOTES
"Daily Note     Today's date: 2024  Patient name: Jesse Shore  : 1935  MRN: [id number]  Referring provider: Humberto Escalona PA-C  Dx:   Encounter Diagnosis     ICD-10-CM    1. Abnormal gait  R26.9       2. Dementia with other behavioral disturbance, unspecified dementia severity, unspecified dementia type (HCC)  F03.918       3. Chronic atrial fibrillation (HCC)  I48.20           Start Time: 1448          Subjective:   I'm able to go up and down the steps better.  I still have difficulty reaching down to put on my shoes      Objective: See treatment diary below      Assessment: Tolerated treatment fair.  Reports high anxiety levels today in general.  Reports improved ability to perform steps.  Difficulty with reaching to lower surfaces/feet due to tightness in lower back.   Added new stretching to address lower back tightness.  Patient would benefit from continued PT      Plan: Continue per plan of care.      Precautions:  10# lifting restriction, PACEMAKER (no e-stim/ultrasound)       Manuals 12-2-24 11-15-24 11-18-24 11-21-24 11-25-24                                           Neuro Re-Ed          Side stepping at rail         U stance         Stand static on foam         Tandem stance         Stepping on/off foam   10x R/L at rail on/off foam  (Lateral) 10x R/L at rail on/off foam  (Lateral)    LTP/MTP  Green band 20x ea Green band 20x ea Green band 25x ea   Green band  LTP 20x  MTP 20x   St hip 3-way                   Ther Ex         Nustep for LE strength, endurance L4 15' Lv3 20' Lv3 20' Lv3 20' L4  22'   TR/HR 20x ea  20x ea 20x ea 20x ea 22x   LAQ         Seated trunk stretch with physioball 15x  5-10\"  Fwd, R/L       T-band HS curl         Seated t-band abd  Green 25x Blue 20x Blue 20x Blue 22x    Add sq  5\" x20 5\" x20 5\" x20  22x  5\"   Seated march                                      HEP         Ther Activity         Mini squat 15x  20x with ext cue of chair for technique  20x with ext cue of " chair for technique.  20x with ext cue of chair for technique.  20x   Sit to stand         Step up                   Gait Training                             Modalities

## 2024-12-06 ENCOUNTER — OFFICE VISIT (OUTPATIENT)
Dept: PHYSICAL THERAPY | Facility: CLINIC | Age: 89
End: 2024-12-06
Payer: MEDICARE

## 2024-12-06 DIAGNOSIS — F03.918 DEMENTIA WITH OTHER BEHAVIORAL DISTURBANCE, UNSPECIFIED DEMENTIA SEVERITY, UNSPECIFIED DEMENTIA TYPE (HCC): ICD-10-CM

## 2024-12-06 DIAGNOSIS — I48.20 CHRONIC ATRIAL FIBRILLATION (HCC): ICD-10-CM

## 2024-12-06 DIAGNOSIS — R26.9 ABNORMAL GAIT: Primary | ICD-10-CM

## 2024-12-06 PROCEDURE — 97110 THERAPEUTIC EXERCISES: CPT | Performed by: PHYSICAL THERAPIST

## 2024-12-06 PROCEDURE — 97530 THERAPEUTIC ACTIVITIES: CPT | Performed by: PHYSICAL THERAPIST

## 2024-12-06 NOTE — PROGRESS NOTES
"Daily Note     Today's date: 2024  Patient name: Jesse Shore  : 1935  MRN: [id number]  Referring provider: Humberto Escalona PA-C  Dx:   Encounter Diagnosis     ICD-10-CM    1. Abnormal gait  R26.9       2. Dementia with other behavioral disturbance, unspecified dementia severity, unspecified dementia type (HCC)  F03.918       3. Chronic atrial fibrillation (HCC)  I48.20                      Subjective: Pt reports no new complaints      Objective: See treatment diary below      Assessment: Tolerated treatment well. Patient able to progress to more repetitions today.      Plan: Continue per plan of care.  Progress treatment as tolerated.       Precautions:  10# lifting restriction, PACEMAKER (no e-stim/ultrasound)       Manuals 24                                           Neuro Re-Ed          Side stepping at rail         U stance         Stand static on foam         Tandem stance         Stepping on/off foam  20x 10x R/L at rail on/off foam  (Lateral) 10x R/L at rail on/off foam  (Lateral)    LTP/MTP  Green band 20x ea Green band 20x ea Green band 25x ea   Green band  LTP 20x  MTP 20x   St hip 3-way                   Ther Ex         Nustep for LE strength, endurance L4 15' Lv3 20' Lv3 20' Lv3 20' L4  22'   TR/HR 20x ea  20x ea 23x ea 20x ea 22x   LAQ         Seated trunk stretch with physioball 15x  5-10\"  Fwd, R/L 15x 10\"       T-band HS curl         Seated t-band abd  Green 25x Blue 20x Blue 20x Blue 22x    Add sq  5\" x20 5\" x20 5\" x20  22x  5\"   Seated march                                      HEP         Ther Activity         Mini squat 15x  20x with ext cue of chair for technique  20x with ext cue of chair for technique.  20x with ext cue of chair for technique.  20x   Sit to stand         Step up                   Gait Training                             Modalities                                                  "

## 2024-12-09 ENCOUNTER — OFFICE VISIT (OUTPATIENT)
Dept: PHYSICAL THERAPY | Facility: CLINIC | Age: 89
End: 2024-12-09
Payer: MEDICARE

## 2024-12-09 DIAGNOSIS — R26.9 ABNORMAL GAIT: Primary | ICD-10-CM

## 2024-12-09 DIAGNOSIS — F03.918 DEMENTIA WITH OTHER BEHAVIORAL DISTURBANCE, UNSPECIFIED DEMENTIA SEVERITY, UNSPECIFIED DEMENTIA TYPE (HCC): ICD-10-CM

## 2024-12-09 DIAGNOSIS — I48.20 CHRONIC ATRIAL FIBRILLATION (HCC): ICD-10-CM

## 2024-12-09 PROCEDURE — 97110 THERAPEUTIC EXERCISES: CPT

## 2024-12-09 NOTE — PROGRESS NOTES
"Daily Note     Today's date: 2024  Patient name: Jesse Shore  : 1935  MRN: [id number]  Referring provider: Humberto Escalona PA-C  Dx:   Encounter Diagnosis     ICD-10-CM    1. Abnormal gait  R26.9       2. Dementia with other behavioral disturbance, unspecified dementia severity, unspecified dementia type (HCC)  F03.918       3. Chronic atrial fibrillation (HCC)  I48.20                      Subjective:   I'm ok today      Objective: See treatment diary below      Assessment: Tolerated treatment well.  Continues to perform varied reps of TE.  Fatigues with standing activity.  No pain reported during tx.  Patient would benefit from continued PT      Plan: Continue per plan of care.      Precautions:  10# lifting restriction, PACEMAKER (no e-stim/ultrasound)       Manuals 24                                           Neuro Re-Ed          Side stepping at rail         U stance         Stand static on foam         Tandem stance         Stepping on/off foam  20x  10x R/L at rail on/off foam  (Lateral)    LTP/MTP  Green band 20x ea Green band LTP 20x  MTP 11x  Green band 25x ea   Green band  LTP 20x  MTP 20x   St hip 3-way                   Ther Ex         Nustep for LE strength, endurance L4 15' Lv3 20' L4 20' Lv3 20' L4  22'   TR/HR 20x ea  20x ea 23x ea 20x ea 22x   LAQ         Seated trunk stretch with physioball 15x  5-10\"  Fwd, R/L 15x 10\"  15x  10\"  Fwd     T-band HS curl         Seated t-band abd  Green 25x Blue 20x Blue 20x Blue 22x    Add sq  5\" x20 5\" x20 5\" x20  22x  5\"   Seated march                                      HEP         Ther Activity         Mini squat 15x  20x with ext cue of chair for technique  20x  20x with ext cue of chair for technique.  20x   Sit to stand         Step up                   Gait Training                             Modalities                                                    "

## 2024-12-13 ENCOUNTER — OFFICE VISIT (OUTPATIENT)
Dept: PHYSICAL THERAPY | Facility: CLINIC | Age: 89
End: 2024-12-13
Payer: MEDICARE

## 2024-12-13 DIAGNOSIS — I48.20 CHRONIC ATRIAL FIBRILLATION (HCC): ICD-10-CM

## 2024-12-13 DIAGNOSIS — R26.9 ABNORMAL GAIT: Primary | ICD-10-CM

## 2024-12-13 DIAGNOSIS — F03.918 DEMENTIA WITH OTHER BEHAVIORAL DISTURBANCE, UNSPECIFIED DEMENTIA SEVERITY, UNSPECIFIED DEMENTIA TYPE (HCC): ICD-10-CM

## 2024-12-13 PROCEDURE — 97110 THERAPEUTIC EXERCISES: CPT

## 2024-12-13 NOTE — PROGRESS NOTES
"Daily Note     Today's date: 2024  Patient name: Jesse Shore  : 1935  MRN: [id number]  Referring provider: Humberto Escalona PA-C  Dx:   Encounter Diagnosis     ICD-10-CM    1. Abnormal gait  R26.9       2. Dementia with other behavioral disturbance, unspecified dementia severity, unspecified dementia type (HCC)  F03.918       3. Chronic atrial fibrillation (HCC)  I48.20                      Subjective: Jesse reports that today might be his last visit. He has appt with pain management on Wednesday. He reports more discomfort to L aspect of l-spine.       Objective: See treatment diary below      Assessment: Muscle fatigue with HR/TR. Some discomfort in LB with mini squats therefore held additional reps for today. Pt reported improvement in discomfort with pball roll outs especially to the R to stretch the R side. Pt would continue to benefit from skilled PT.       Plan: Continue with current POC to address pt deficits.      Precautions:  10# lifting restriction, PACEMAKER (no e-stim/ultrasound)       Manuals -224                                           Neuro Re-Ed          Side stepping at rail         U stance         Stand static on foam         Tandem stance         Stepping on/off foam  20x      LTP/MTP  Green band 20x ea Green band LTP 20x  MTP 11x  Not today   Green band  LTP 20x  MTP 20x   St hip 3-way                   Ther Ex         Nustep for LE strength, endurance L4 15' Lv3 20' L4 20' Lv4 20' L4  22'   TR/HR 20x ea  20x ea 23x ea x17 22x   LAQ         Seated trunk stretch with physioball 15x  5-10\"  Fwd, R/L 15x 10\"  15x  10\"  Fwd 15x  5-10\"  Fwd, R/L    T-band HS curl         Seated t-band abd  Green 25x Blue 20x Blue x20 Blue 22x    Add sq  5\" x20 5\" x20 5\" x20  22x  5\"   Seated march                                      HEP         Ther Activity         Mini squat 15x  20x with ext cue of chair for technique  20x  x11  20x   Sit to stand         Step " up                   Gait Training                             Modalities

## 2025-08-19 ENCOUNTER — EVALUATION (OUTPATIENT)
Dept: PHYSICAL THERAPY | Facility: CLINIC | Age: 89
End: 2025-08-19
Payer: MEDICARE

## 2025-08-19 DIAGNOSIS — M54.59 OTHER LOW BACK PAIN: Primary | ICD-10-CM

## 2025-08-19 PROCEDURE — 97162 PT EVAL MOD COMPLEX 30 MIN: CPT | Performed by: PHYSICAL THERAPIST

## 2025-08-19 PROCEDURE — 97110 THERAPEUTIC EXERCISES: CPT | Performed by: PHYSICAL THERAPIST

## 2025-08-22 ENCOUNTER — OFFICE VISIT (OUTPATIENT)
Dept: PHYSICAL THERAPY | Facility: CLINIC | Age: 89
End: 2025-08-22
Attending: PAIN MEDICINE
Payer: MEDICARE

## 2025-08-22 DIAGNOSIS — M54.59 OTHER LOW BACK PAIN: Primary | ICD-10-CM

## 2025-08-22 PROCEDURE — 97110 THERAPEUTIC EXERCISES: CPT | Performed by: PHYSICAL THERAPIST
